# Patient Record
Sex: MALE | Race: WHITE | NOT HISPANIC OR LATINO | Employment: FULL TIME | URBAN - METROPOLITAN AREA
[De-identification: names, ages, dates, MRNs, and addresses within clinical notes are randomized per-mention and may not be internally consistent; named-entity substitution may affect disease eponyms.]

---

## 2020-06-16 ENCOUNTER — OFFICE VISIT (OUTPATIENT)
Dept: URGENT CARE | Facility: MEDICAL CENTER | Age: 30
End: 2020-06-16
Payer: COMMERCIAL

## 2020-06-16 VITALS
TEMPERATURE: 97.6 F | BODY MASS INDEX: 27.92 KG/M2 | HEIGHT: 70 IN | HEART RATE: 113 BPM | OXYGEN SATURATION: 98 % | DIASTOLIC BLOOD PRESSURE: 86 MMHG | WEIGHT: 195 LBS | SYSTOLIC BLOOD PRESSURE: 143 MMHG | RESPIRATION RATE: 16 BRPM

## 2020-06-16 DIAGNOSIS — S60.454A FOREIGN BODY OF RIGHT RING FINGER: Primary | ICD-10-CM

## 2020-06-16 PROCEDURE — 99213 OFFICE O/P EST LOW 20 MIN: CPT | Performed by: FAMILY MEDICINE

## 2020-06-16 RX ORDER — AMOXICILLIN 875 MG/1
875 TABLET, COATED ORAL 2 TIMES DAILY
Qty: 10 TABLET | Refills: 0 | Status: SHIPPED | OUTPATIENT
Start: 2020-06-16 | End: 2020-06-21

## 2020-11-20 ENCOUNTER — NURSE TRIAGE (OUTPATIENT)
Dept: OTHER | Facility: OTHER | Age: 30
End: 2020-11-20

## 2020-11-20 DIAGNOSIS — Z20.822 SUSPECTED COVID-19 VIRUS INFECTION: Primary | ICD-10-CM

## 2020-11-21 DIAGNOSIS — Z20.822 SUSPECTED COVID-19 VIRUS INFECTION: ICD-10-CM

## 2020-11-21 PROCEDURE — U0003 INFECTIOUS AGENT DETECTION BY NUCLEIC ACID (DNA OR RNA); SEVERE ACUTE RESPIRATORY SYNDROME CORONAVIRUS 2 (SARS-COV-2) (CORONAVIRUS DISEASE [COVID-19]), AMPLIFIED PROBE TECHNIQUE, MAKING USE OF HIGH THROUGHPUT TECHNOLOGIES AS DESCRIBED BY CMS-2020-01-R: HCPCS | Performed by: FAMILY MEDICINE

## 2020-11-22 LAB — SARS-COV-2 RNA SPEC QL NAA+PROBE: NOT DETECTED

## 2021-03-08 ENCOUNTER — OFFICE VISIT (OUTPATIENT)
Dept: URGENT CARE | Facility: CLINIC | Age: 31
End: 2021-03-08
Payer: COMMERCIAL

## 2021-03-08 VITALS
DIASTOLIC BLOOD PRESSURE: 86 MMHG | OXYGEN SATURATION: 99 % | SYSTOLIC BLOOD PRESSURE: 140 MMHG | HEART RATE: 108 BPM | RESPIRATION RATE: 20 BRPM | TEMPERATURE: 99.1 F

## 2021-03-08 DIAGNOSIS — R05.9 COUGH: Primary | ICD-10-CM

## 2021-03-08 PROCEDURE — U0003 INFECTIOUS AGENT DETECTION BY NUCLEIC ACID (DNA OR RNA); SEVERE ACUTE RESPIRATORY SYNDROME CORONAVIRUS 2 (SARS-COV-2) (CORONAVIRUS DISEASE [COVID-19]), AMPLIFIED PROBE TECHNIQUE, MAKING USE OF HIGH THROUGHPUT TECHNOLOGIES AS DESCRIBED BY CMS-2020-01-R: HCPCS | Performed by: NURSE PRACTITIONER

## 2021-03-08 PROCEDURE — U0005 INFEC AGEN DETEC AMPLI PROBE: HCPCS | Performed by: NURSE PRACTITIONER

## 2021-03-08 PROCEDURE — S9083 URGENT CARE CENTER GLOBAL: HCPCS | Performed by: NURSE PRACTITIONER

## 2021-03-08 PROCEDURE — G0382 LEV 3 HOSP TYPE B ED VISIT: HCPCS | Performed by: NURSE PRACTITIONER

## 2021-03-08 NOTE — PATIENT INSTRUCTIONS
Patient Instructions     COVID testing initiated  Results may take up to 5-10 days to return, but often come back sooner (2-4 days)     If the patient has a [facility] My Chart account, results may be accessed on line  If the patient does not have the [facility] Chart account, please establish one so results can be accessed  This will be the easiest and quickest way to get a copy of your test results if you require printed documentation  If patient is symptomatic and until results are obtained, home quarantine / self isolation strongly encouraged  If testing is done for screening purposes and patient is not symptomatic, we still recommend masking, social distancing, good hygiene practices be followed  If COVID test is positive, patient / care giver will be contacted by ordering provider or designated staff  If COVID test is positive, please call the primary care provider office to inform of positive test and request follow up evaluation appointment  (Generally, primary care providers are doing telemedicine visits with their positive COVID patients )  If COVID test is positive, please again review all information below  Further questions may be addressed by the primary care provider or the [address] [place] [address] [place] at [phone]  If the patient / caregiver has not heard about test results or has been unable to access results on the patient My Chart account in a timely fashion, please call the provider's office where test was ordered (or Hot Line if applicable)  to inquire about results  If results are negative and patient / care giver has been found to have already accessed results through the [facility]  [facility] Chart [facility]ugene, no call will be made  Until results are obtained, home quarantine / self isolation strongly encouraged       If the patient would develop profound weakness, chest pain, shortness of breath please proceed to an emergency room for further evaluation otherwise we do recommend that patient follow-up with their primary care provider in the next 5-7 days if not improving  Symptomatic treatment as needed for symptoms relief based on age / medical status of patient  Things like warm salt water gargles, Tylenol or Ibuprofen (if not contraindicated), drinking plenty of fluids, nasal saline rinses / spray, warm tea with honey (not for patients less than 1 year of age),  etc may provide symptoms relief  [address]    Your healthcare provider and/or public health staff have evaluated you and have determined that you do not need to remain in the hospital at this time  At this time you can be isolated at home where you will be monitored by staff from your local or state health department  You should carefully follow the prevention and isolation steps below until a healthcare provider or local or state health department says that you can return to your normal activities  Stay home except to get medical care    People who are mildly ill with COVID-19 are able to isolate at home during their illness  You should restrict activities outside your home, except for getting medical care  Do not go to work, school, or public areas  Avoid using public transportation, ride-sharing, or taxis  Separate yourself from other people and animals in your home    People: As much as possible, you should stay in a specific room and away from other people in your home  Also, you should use a separate bathroom, if available  Animals: You should restrict contact with pets and other animals while you are sick with COVID-19, just like you would around other people  Although there have not been reports of pets or other animals becoming sick with COVID-19, it is still recommended that people sick with COVID-19 limit contact with animals until more information is known about the virus  When possible, have another member of your household care for your animals while you are sick   If you are sick with COVID-19, avoid contact with your pet, including petting, snuggling, being kissed or licked, and sharing food  If you must care for your pet or be around animals while you are sick, wash your hands before and after you interact with pets and wear a facemask  See COVID-19 and Animals for more information  Call ahead before visiting your doctor    If you have a medical appointment, call the healthcare provider and tell them that you have or may have COVID-19  This will help the healthcare providers office take steps to keep other people from getting infected or exposed  Wear a facemask    You should wear a facemask when you are around other people (e g , sharing a room or vehicle) or pets and before you enter a healthcare providers office  If you are not able to wear a facemask (for example, because it causes trouble breathing), then people who live with you should not stay in the same room with you, or they should wear a facemask if they enter your room  Cover your coughs and sneezes    Cover your mouth and nose with a tissue when you cough or sneeze  Throw used tissues in a lined trash can  Immediately wash your hands with soap and water for at least 20 seconds or, if soap and water are not available, clean your hands with an alcohol-based hand  that contains at least 60% alcohol  Clean your hands often    Wash your hands often with soap and water for at least 20 seconds, especially after blowing your nose, coughing, or sneezing; going to the bathroom; and before eating or preparing food  If soap and water are not readily available, use an alcohol-based hand  with at least 60% alcohol, covering all surfaces of your hands and rubbing them together until they feel dry  Soap and water are the best option if hands are visibly dirty  Avoid touching your eyes, nose, and mouth with unwashed hands      Avoid sharing personal household items    You should not share dishes, drinking glasses, cups, eating utensils, towels, or bedding with other people or pets in your home  After using these items, they should be washed thoroughly with soap and water  Clean all high-touch surfaces everyday    High touch surfaces include counters, tabletops, doorknobs, bathroom fixtures, toilets, phones, keyboards, tablets, and bedside tables  Also, clean any surfaces that may have blood, stool, or body fluids on them  Use a household cleaning spray or wipe, according to the label instructions  Labels contain instructions for safe and effective use of the cleaning product including precautions you should take when applying the product, such as wearing gloves and making sure you have good ventilation during use of the product  Monitor your symptoms    Seek prompt medical attention if your illness is worsening (e g , difficulty breathing)  Before seeking care, call your healthcare provider and tell them that you have, or are being evaluated for, COVID-19  Put on a facemask before you enter the facility  These steps will help the healthcare providers office to keep other people in the office or waiting room from getting infected or exposed  Ask your healthcare provider to call the local or [facility] health department  Persons who are placed under active monitoring or facilitated self-monitoring should follow instructions provided by their local health department or occupational health professionals, as appropriate  If you have a medical emergency and need to call 911, notify the dispatch personnel that you have, or are being evaluated for COVID-19  If possible, put on a facemask before emergency medical services arrive      Discontinuing home isolation    Patients with confirmed COVID-19 should remain under home isolation precautions until the following conditions are met:   - They have had no fever for at least 24 hours (that is one full day of no fever without the use medicine that reduces fevers)  AND  - other symptoms have improved (for example, when their cough or shortness of breath have improved)  AND  - If had mild or moderate illness, at least 10 days have passed since their symptoms first appeared or if severe illness (needed oxygen) or immunosuppressed, at least 20 days have passed since symptoms first appeared  Patients with confirmed COVID-19 should also notify close contacts (including their workplace) and ask that they self-quarantine  Currently, close contact is defined as being within 6 feet for 15 minutes or more from the period 24 hours starting 48 hours before symptom onset to the time at which the patient went into isolation  Close contacts of patients diagnosed with COVID-19 should be instructed by the patient to self-quarantine for 14 days from the last time of their last contact with the patient       Source: [employer]

## 2021-03-08 NOTE — PROGRESS NOTES
[facility]        NAME: Jena Camara is a 32 y o  male  : 1990    MRN: [id number]  DATE: 2021  TIME: 5:18 PM    Assessment and Plan   Cough [R05]  1  Cough  Novel Coronavirus (Covid-19),PCR [facility] - Office Collection     covid swab done -   No work until results back   rec trial reflux meds or antihistamine for cough if returns after eating when URI symptoms are resolved  Pt in agreement with plan of care    Patient Instructions     Follow up with PCP in 3-5 days  Proceed to  ER if symptoms worsen  Chief Complaint     Chief Complaint   Patient presents with    Cough     Cough for month that comes after he eats  Patient states that he still has cough, but now has a  HA, sore throat and nose stuffy nose  History of Present Illness   Jena Camara presents to the clinic c/o    Cough for month that comes after he eats  Patient states that he still has cough, but now has a HA, sore throat and nose stuffy nose  Brother was sick recently - he was around him  Brother did not get covid testing  States brother had covid a few months ago  Marie Nicholas had first covid shot about a month - is scheduled for second shot tomorrow  Works in IT for [employer]  Did not try anything for the cough originally - no reflux meds or antihistamines  Review of Systems   Review of Systems   All other systems reviewed and are negative  Current Medications     No long-term medications on file  Current Allergies     Allergies as of 2021    (No Known Allergies)            The following portions of the patient's history were reviewed and updated as appropriate: allergies, current medications, past family history, past medical history, past social history, past surgical history and problem list     Objective   /86   Pulse (!) 108   Temp 99 1 °F (37 3 °C) (Tympanic)   Resp 20   SpO2 99%        Physical Exam     Physical Exam  Vitals signs and nursing note reviewed  Constitutional:       Appearance: Normal appearance  He is well-developed  HENT:      Head: Normocephalic and atraumatic  Right Ear: Tympanic membrane, ear canal and external ear normal       Left Ear: Tympanic membrane, ear canal and external ear normal       Nose: Nose normal    Eyes:      General: Lids are normal       Conjunctiva/sclera: Conjunctivae normal       Pupils: Pupils are equal, round, and reactive to light  Neck:      Musculoskeletal: Neck supple  Cardiovascular:      Rate and Rhythm: Normal rate and regular rhythm  Pulses: Normal pulses  Heart sounds: Normal heart sounds, S1 normal and S2 normal    Pulmonary:      Effort: Pulmonary effort is normal       Breath sounds: Normal breath sounds  Skin:     General: Skin is warm and dry  Neurological:      Mental Status: He is alert  Psychiatric:         Speech: Speech normal          Behavior: Behavior normal          Thought Content:  Thought content normal          Judgment: Judgment normal

## 2021-03-09 LAB — SARS-COV-2 RNA RESP QL NAA+PROBE: NEGATIVE

## 2021-06-29 ENCOUNTER — OFFICE VISIT (OUTPATIENT)
Dept: FAMILY MEDICINE CLINIC | Facility: CLINIC | Age: 31
End: 2021-06-29
Payer: COMMERCIAL

## 2021-06-29 ENCOUNTER — APPOINTMENT (OUTPATIENT)
Dept: LAB | Facility: MEDICAL CENTER | Age: 31
End: 2021-06-29
Payer: COMMERCIAL

## 2021-06-29 VITALS
DIASTOLIC BLOOD PRESSURE: 80 MMHG | OXYGEN SATURATION: 98 % | SYSTOLIC BLOOD PRESSURE: 118 MMHG | BODY MASS INDEX: 27.86 KG/M2 | HEIGHT: 71 IN | HEART RATE: 90 BPM | WEIGHT: 199 LBS | TEMPERATURE: 98.4 F

## 2021-06-29 DIAGNOSIS — Z13.29 SCREENING FOR THYROID DISORDER: ICD-10-CM

## 2021-06-29 DIAGNOSIS — Z76.89 ENCOUNTER TO ESTABLISH CARE: ICD-10-CM

## 2021-06-29 DIAGNOSIS — R51.9 INTRACTABLE HEADACHE, UNSPECIFIED CHRONICITY PATTERN, UNSPECIFIED HEADACHE TYPE: Primary | ICD-10-CM

## 2021-06-29 DIAGNOSIS — R53.83 FATIGUE, UNSPECIFIED TYPE: ICD-10-CM

## 2021-06-29 DIAGNOSIS — R06.83 SNORING: ICD-10-CM

## 2021-06-29 DIAGNOSIS — Z13.220 SCREENING FOR LIPID DISORDERS: ICD-10-CM

## 2021-06-29 DIAGNOSIS — Z13.1 SCREENING FOR DIABETES MELLITUS: ICD-10-CM

## 2021-06-29 DIAGNOSIS — Z13.0 SCREENING FOR DEFICIENCY ANEMIA: ICD-10-CM

## 2021-06-29 DIAGNOSIS — L30.0 NUMMULAR ECZEMA: ICD-10-CM

## 2021-06-29 LAB
ALBUMIN SERPL BCP-MCNC: 4 G/DL (ref 3.5–5)
ALP SERPL-CCNC: 74 U/L (ref 46–116)
ALT SERPL W P-5'-P-CCNC: 54 U/L (ref 12–78)
ANION GAP SERPL CALCULATED.3IONS-SCNC: 5 MMOL/L (ref 4–13)
AST SERPL W P-5'-P-CCNC: 26 U/L (ref 5–45)
BASOPHILS # BLD AUTO: 0.07 THOUSANDS/ΜL (ref 0–0.1)
BASOPHILS NFR BLD AUTO: 1 % (ref 0–1)
BILIRUB SERPL-MCNC: 0.38 MG/DL (ref 0.2–1)
BUN SERPL-MCNC: 11 MG/DL (ref 5–25)
CALCIUM SERPL-MCNC: 9.1 MG/DL (ref 8.3–10.1)
CHLORIDE SERPL-SCNC: 105 MMOL/L (ref 100–108)
CHOLEST SERPL-MCNC: 246 MG/DL (ref 50–200)
CO2 SERPL-SCNC: 27 MMOL/L (ref 21–32)
CREAT SERPL-MCNC: 1.25 MG/DL (ref 0.6–1.3)
EOSINOPHIL # BLD AUTO: 0.33 THOUSAND/ΜL (ref 0–0.61)
EOSINOPHIL NFR BLD AUTO: 3 % (ref 0–6)
ERYTHROCYTE [DISTWIDTH] IN BLOOD BY AUTOMATED COUNT: 13.3 % (ref 11.6–15.1)
GFR SERPL CREATININE-BSD FRML MDRD: 76 ML/MIN/1.73SQ M
GLUCOSE P FAST SERPL-MCNC: 105 MG/DL (ref 65–99)
HCT VFR BLD AUTO: 45.2 % (ref 36.5–49.3)
HDLC SERPL-MCNC: 37 MG/DL
HGB BLD-MCNC: 14.9 G/DL (ref 12–17)
IMM GRANULOCYTES # BLD AUTO: 0.04 THOUSAND/UL (ref 0–0.2)
IMM GRANULOCYTES NFR BLD AUTO: 0 % (ref 0–2)
LDLC SERPL CALC-MCNC: 165 MG/DL (ref 0–100)
LYMPHOCYTES # BLD AUTO: 2.68 THOUSANDS/ΜL (ref 0.6–4.47)
LYMPHOCYTES NFR BLD AUTO: 27 % (ref 14–44)
MCH RBC QN AUTO: 29.9 PG (ref 26.8–34.3)
MCHC RBC AUTO-ENTMCNC: 33 G/DL (ref 31.4–37.4)
MCV RBC AUTO: 91 FL (ref 82–98)
MONOCYTES # BLD AUTO: 0.75 THOUSAND/ΜL (ref 0.17–1.22)
MONOCYTES NFR BLD AUTO: 7 % (ref 4–12)
NEUTROPHILS # BLD AUTO: 6.21 THOUSANDS/ΜL (ref 1.85–7.62)
NEUTS SEG NFR BLD AUTO: 62 % (ref 43–75)
NONHDLC SERPL-MCNC: 209 MG/DL
NRBC BLD AUTO-RTO: 0 /100 WBCS
PLATELET # BLD AUTO: 309 THOUSANDS/UL (ref 149–390)
PMV BLD AUTO: 9.9 FL (ref 8.9–12.7)
POTASSIUM SERPL-SCNC: 4.1 MMOL/L (ref 3.5–5.3)
PROT SERPL-MCNC: 7.4 G/DL (ref 6.4–8.2)
RBC # BLD AUTO: 4.98 MILLION/UL (ref 3.88–5.62)
SODIUM SERPL-SCNC: 137 MMOL/L (ref 136–145)
TRIGL SERPL-MCNC: 219 MG/DL
TSH SERPL DL<=0.05 MIU/L-ACNC: 1.03 UIU/ML (ref 0.36–3.74)
WBC # BLD AUTO: 10.08 THOUSAND/UL (ref 4.31–10.16)

## 2021-06-29 PROCEDURE — 84443 ASSAY THYROID STIM HORMONE: CPT

## 2021-06-29 PROCEDURE — 80053 COMPREHEN METABOLIC PANEL: CPT

## 2021-06-29 PROCEDURE — 80061 LIPID PANEL: CPT

## 2021-06-29 PROCEDURE — 3725F SCREEN DEPRESSION PERFORMED: CPT | Performed by: NURSE PRACTITIONER

## 2021-06-29 PROCEDURE — 85025 COMPLETE CBC W/AUTO DIFF WBC: CPT

## 2021-06-29 PROCEDURE — 36415 COLL VENOUS BLD VENIPUNCTURE: CPT

## 2021-06-29 PROCEDURE — 99203 OFFICE O/P NEW LOW 30 MIN: CPT | Performed by: NURSE PRACTITIONER

## 2021-06-29 RX ORDER — METHOCARBAMOL 500 MG/1
500 TABLET, FILM COATED ORAL 4 TIMES DAILY
Qty: 30 TABLET | Refills: 0 | Status: SHIPPED | OUTPATIENT
Start: 2021-06-29

## 2021-07-01 NOTE — PROGRESS NOTES
[facility]    ASSESSMENT AND PLAN     1  Intractable headache, unspecified chronicity pattern, unspecified headache type   reviewed symptoms length today  Physical assessment unremarkable  Based on description and location, consider cause of muscular tension  Discussed proper body mechanics, since patient sits at a computer for extended hours at a time  Recommend optometric exam, to assess visual acuity  May continue ibuprofen PRN as it does appear effective  Rx today for Robaxin  May use at night for muscle tension/spasming  Discuss use of ice/heat  Encourage smoking cessation as below  Decrease caffeine/sugar intake  Fasting lab work orders placed as below  Obtain within the next 1-2 weeks, and return for physical and symptom reassessment at that time  Keep activity/ diet diary to assess for possible triggers and or relieving  Patient agreeable with above plan  ER precautions with unrelenting and or severe headache    - methocarbamol (ROBAXIN) 500 mg tablet; Take 1 tablet (500 mg total) by mouth 4 (four) times a day  Dispense: 30 tablet; Refill: 0    2  Screening for deficiency anemia    - CBC and differential; Future    3  Screening for diabetes mellitus    - Comprehensive metabolic panel; Future    4  Screening for thyroid disorder    - TSH, 3rd generation with Free T4 reflex; Future    5  Screening for lipid disorders    - Lipid panel; Future    6  Nummular eczema   patient states he was diagnosed with nummular eczema in 2018  He does have a steroid cream that he uses periodically  Requests referral to Dermatology for assessment and possible alternate treatment options    - Ambulatory referral to Dermatology; Future      7  Fatigue, unspecified type  Reviewed symptoms  Appears his fatigue is multifactorial  Decreased restful sleep - pt with snoring and possible apnea  Will refer to Sleep medicine for evaluation  Additionally discussed poor diet   Consumes excessive sugar and caffiene during the day  Discussed negative effect on sleep  Will work to decrease  May continue Melatonin 5  Discuss further at next follow up    8  Snoring    -  Ambulatory referral to Sleep Medicine      9  Encounter to establish care   establishing primary care in our office  Transitioning from [facility]  SUBJECTIVE       Patient ID: Juan Garland is a 32 y o  male  Chief Complaint   Patient presents with    Headache     x2 weeks     Fatigue     x several years        HISTORY OF PRESENT ILLNESS     Patient presents today as a new patient  Transitioning from [facility]  Complains today of persistent headache  Roughly the last 2-2 1/2 weeks  Primarily in the back of his head  Some upper neck tenderness  Does not radiate  Cannot identify any aggravating triggers  Can not rate the pain, just states it is dull,  "annoying" and fairly constant  OT C ibuprofen will relieve it, but it does return once a ibuprofen wears off  Denies any eye pain  No blurriness or double vision  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  complains today also of persistent fatigue  This has been present for years  Excessive fatigue at times during the day        The following portions of the patient's history were reviewed and updated as appropriate: allergies, current medications, past family history, past medical history, past social history, past surgical history and problem list     REVIEW OF SYSTEMS  Review of Systems   Constitutional: Positive for fatigue (ongoing for years)  HENT: Negative  Respiratory: Negative  Cardiovascular: Negative  Gastrointestinal: Negative  Neurological: Positive for headaches (as in HPI)  Negative for dizziness  Psychiatric/Behavioral: Negative          OBJECTIVE      VITAL SIGNS  /80 (BP Location: Right arm, Patient Position: Sitting)   Pulse 90   Temp 98 4 °F (36 9 °C) (Tympanic)   Ht 5' 10 75" (1 797 m)   Wt 90 3 kg (199 lb)   SpO2 98%   BMI 27 95 kg/m² CURRENT MEDICATIONS    Current Outpatient Medications:     methocarbamol (ROBAXIN) 500 mg tablet, Take 1 tablet (500 mg total) by mouth 4 (four) times a day, Disp: 30 tablet, Rfl: 0      PHYSICAL EXAMINATION   Physical Exam  Vitals and nursing note reviewed  Constitutional:       General: He is not in acute distress  Appearance: Normal appearance  He is well-developed  He is not ill-appearing  HENT:      Head: Normocephalic and atraumatic  Right Ear: Hearing, tympanic membrane, ear canal and external ear normal       Left Ear: Hearing, tympanic membrane, ear canal and external ear normal       Nose: Nose normal       Mouth/Throat:      Mouth: Mucous membranes are moist       Pharynx: Oropharynx is clear  Eyes:      Extraocular Movements: Extraocular movements intact  Conjunctiva/sclera: Conjunctivae normal       Pupils: Pupils are equal, round, and reactive to light  Neck:      Vascular: No carotid bruit  Cardiovascular:      Rate and Rhythm: Normal rate and regular rhythm  No extrasystoles are present  Heart sounds: Normal heart sounds  Pulmonary:      Effort: Pulmonary effort is normal  No respiratory distress  Breath sounds: Normal breath sounds and air entry  Musculoskeletal:      Right lower leg: No edema  Left lower leg: No edema  Lymphadenopathy:      Cervical: No cervical adenopathy  Skin:     General: Skin is warm and dry  Comments: Small, red bumps scattered on legs b/l  Neurological:      General: No focal deficit present  Mental Status: He is alert and oriented to person, place, and time  Cranial Nerves: No cranial nerve deficit  Sensory: No sensory deficit  Motor: No weakness        Coordination: Coordination normal       Gait: Gait normal    Psychiatric:         Attention and Perception: Attention normal          Mood and Affect: Mood and affect normal          Speech: Speech normal          Behavior: Behavior normal  Thought Content:  Thought content normal

## 2021-07-15 ENCOUNTER — OFFICE VISIT (OUTPATIENT)
Dept: FAMILY MEDICINE CLINIC | Facility: CLINIC | Age: 31
End: 2021-07-15
Payer: COMMERCIAL

## 2021-07-15 VITALS
RESPIRATION RATE: 17 BRPM | BODY MASS INDEX: 27.66 KG/M2 | OXYGEN SATURATION: 99 % | DIASTOLIC BLOOD PRESSURE: 72 MMHG | HEIGHT: 71 IN | WEIGHT: 197.6 LBS | TEMPERATURE: 97.8 F | SYSTOLIC BLOOD PRESSURE: 108 MMHG | HEART RATE: 98 BPM

## 2021-07-15 DIAGNOSIS — E78.5 HYPERLIPIDEMIA, UNSPECIFIED HYPERLIPIDEMIA TYPE: Primary | ICD-10-CM

## 2021-07-15 DIAGNOSIS — R51.9 INTRACTABLE HEADACHE, UNSPECIFIED CHRONICITY PATTERN, UNSPECIFIED HEADACHE TYPE: ICD-10-CM

## 2021-07-15 DIAGNOSIS — A69.20 ERYTHEMA MIGRANS (LYME DISEASE): ICD-10-CM

## 2021-07-15 DIAGNOSIS — Z72.0 TOBACCO USE: ICD-10-CM

## 2021-07-15 DIAGNOSIS — R73.01 ELEVATED FASTING GLUCOSE: ICD-10-CM

## 2021-07-15 LAB — SL AMB POCT HEMOGLOBIN AIC: 5.5 (ref ?–6.5)

## 2021-07-15 PROCEDURE — 83036 HEMOGLOBIN GLYCOSYLATED A1C: CPT | Performed by: NURSE PRACTITIONER

## 2021-07-15 PROCEDURE — 99214 OFFICE O/P EST MOD 30 MIN: CPT | Performed by: NURSE PRACTITIONER

## 2021-07-15 PROCEDURE — 3008F BODY MASS INDEX DOCD: CPT | Performed by: NURSE PRACTITIONER

## 2021-07-15 PROCEDURE — 4004F PT TOBACCO SCREEN RCVD TLK: CPT | Performed by: NURSE PRACTITIONER

## 2021-07-15 RX ORDER — DOXYCYCLINE HYCLATE 100 MG/1
100 CAPSULE ORAL EVERY 12 HOURS SCHEDULED
Qty: 28 CAPSULE | Refills: 0 | Status: SHIPPED | OUTPATIENT
Start: 2021-07-15 | End: 2021-07-29

## 2021-07-15 RX ORDER — NICOTINE 21 MG/24HR
1 PATCH, TRANSDERMAL 24 HOURS TRANSDERMAL EVERY 24 HOURS
Qty: 28 PATCH | Refills: 0 | Status: SHIPPED | OUTPATIENT
Start: 2021-07-15

## 2021-07-15 RX ORDER — NAPROXEN 500 MG/1
500 TABLET ORAL 2 TIMES DAILY WITH MEALS
Qty: 60 TABLET | Refills: 0 | Status: SHIPPED | OUTPATIENT
Start: 2021-07-15

## 2021-07-18 NOTE — PROGRESS NOTES
[facility]    ASSESSMENT AND PLAN     1  Hyperlipidemia, unspecified hyperlipidemia type   reviewed recent lab work  Cholesterol elevated at 264  LDL: 165  HDL: 37  Trigs: 219  Discussed importance of gaining control  Will continue with diet and lifestyle changes  Discussed at length again today  Recheck lipid panel 6 months  Orders placed today-fasting    - Lipid panel; Future    2  Elevated fasting glucose   elevated fastin  POCT hemoglobin A1c today:  5 5    - POCT hemoglobin A1c    3  Tobacco use   strongly encouraged smoking cessation  Will likely improve headaches  Will trial Nicoderm patches  Rx given today    - nicotine (NICODERM CQ) 21 mg/24 hr TD 24 hr patch; Place 1 patch on the skin every 24 hours  Dispense: 28 patch; Refill: 0    4  Erythema migrans (Lyme disease)    Unsure if tick bite on right lower shin  Will treat prophylactically today with 2 week course doxy  Monitor area for signs of infection  Follow-up with any problems and/or concerns    - doxycycline hyclate (VIBRAMYCIN) 100 mg capsule; Take 1 capsule (100 mg total) by mouth every 12 (twelve) hours for 14 days  Dispense: 28 capsule; Refill: 0    5  Intractable headache, unspecified chronicity pattern, unspecified headache type   appear to be slowly improving  May continue muscle relaxer as needed  Rx today for naproxen  Discussed again following up with an optometrist, to assess visual acuity  Recommend diet/activity diary to pinpoint any specific triggers  Continue decreasing alcohol and caffeine intake  Tobacco cessation,  As all these are likely exacerbating factors  Follow-up if persistent  Consider headache specialist referral    - naproxen (NAPROSYN) 500 mg tablet; Take 1 tablet (500 mg total) by mouth 2 (two) times a day with meals  Dispense: 60 tablet; Refill: 0      BMI Counseling: Body mass index is 27 66 kg/m²   The BMI is above normal  Nutrition recommendations include decreasing portion sizes, encouraging healthy choices of fruits and vegetables, decreasing fast food intake, consuming healthier snacks, limiting drinks that contain sugar, moderation in carbohydrate intake, increasing intake of lean protein and reducing intake of saturated and trans fat  Exercise recommendations include moderate physical activity 150 minutes/week  Discussed importance of diet and exercise to decrease his lipid panel  Also discussed benefit of increasing energy level/decrease fatigue  Depression Screening and Follow-up Plan: Clincally patient does not have depression  No treatment is required  Tobacco Cessation Counseling: Tobacco cessation counseling was provided  The patient is sincerely urged to quit consumption of tobacco  He is ready to quit tobacco  Medication options and side effects of medication discussed  Nicotine patch was prescribed  Follow-up with optometry and Sleep Medicine    SUBJECTIVE       Patient ID: Charity Cooley is a 32 y o  male  Chief Complaint   Patient presents with    Follow-up     x3wk Headache & Fatigue       HISTORY OF PRESENT ILLNESS     Patient presents today for follow-up: Headache and fatigue  Does note some improvement since starting the   Muscle relaxer for his headache  Notes of decrease in neck pain  But no change in his fatigue  Has been making lifestyle change  Decreasing alcohol and soda intake  Is now down to 1-2 sodas per day  Has not followed up with optometrist as previously recommended  No dietary/ activity diary to review  Appointment scheduled for sleep medicine        The following portions of the patient's history were reviewed and updated as appropriate: allergies, current medications, past family history, past medical history, past social history, past surgical history and problem list     REVIEW OF SYSTEMS  Review of Systems   Constitutional: Positive for fatigue  Respiratory: Negative  Cardiovascular: Negative      Neurological: Positive for headaches (still present but improving)  Psychiatric/Behavioral: Negative  OBJECTIVE      VITAL SIGNS  /72 (BP Location: Left arm, Patient Position: Sitting, Cuff Size: Standard)   Pulse 98   Temp 97 8 °F (36 6 °C) (Tympanic)   Resp 17   Ht 5' 10 87" (1 8 m)   Wt 89 6 kg (197 lb 9 6 oz)   SpO2 99%   BMI 27 66 kg/m²     CURRENT MEDICATIONS    Current Outpatient Medications:     methocarbamol (ROBAXIN) 500 mg tablet, Take 1 tablet (500 mg total) by mouth 4 (four) times a day, Disp: 30 tablet, Rfl: 0    doxycycline hyclate (VIBRAMYCIN) 100 mg capsule, Take 1 capsule (100 mg total) by mouth every 12 (twelve) hours for 14 days, Disp: 28 capsule, Rfl: 0    naproxen (NAPROSYN) 500 mg tablet, Take 1 tablet (500 mg total) by mouth 2 (two) times a day with meals, Disp: 60 tablet, Rfl: 0    nicotine (NICODERM CQ) 21 mg/24 hr TD 24 hr patch, Place 1 patch on the skin every 24 hours, Disp: 28 patch, Rfl: 0      PHYSICAL EXAMINATION   Physical Exam  Vitals and nursing note reviewed  Constitutional:       General: He is not in acute distress  Appearance: Normal appearance  He is not ill-appearing  Cardiovascular:      Rate and Rhythm: Normal rate and regular rhythm  Heart sounds: Normal heart sounds  Pulmonary:      Effort: Pulmonary effort is normal  No respiratory distress  Breath sounds: Normal breath sounds and air entry  Skin:     General: Skin is warm and dry  Neurological:      General: No focal deficit present  Mental Status: He is alert and oriented to person, place, and time  Psychiatric:         Attention and Perception: Attention normal          Mood and Affect: Mood and affect normal          Speech: Speech normal          Behavior: Behavior normal          Thought Content:  Thought content normal

## 2021-07-29 ENCOUNTER — OFFICE VISIT (OUTPATIENT)
Dept: URGENT CARE | Facility: CLINIC | Age: 31
End: 2021-07-29
Payer: COMMERCIAL

## 2021-07-29 VITALS
TEMPERATURE: 96.4 F | RESPIRATION RATE: 16 BRPM | HEART RATE: 78 BPM | WEIGHT: 197 LBS | BODY MASS INDEX: 27.58 KG/M2 | OXYGEN SATURATION: 98 % | HEIGHT: 71 IN

## 2021-07-29 DIAGNOSIS — J06.9 ACUTE URI: Primary | ICD-10-CM

## 2021-07-29 DIAGNOSIS — J02.9 SORE THROAT: ICD-10-CM

## 2021-07-29 LAB — S PYO AG THROAT QL: NEGATIVE

## 2021-07-29 PROCEDURE — 87880 STREP A ASSAY W/OPTIC: CPT | Performed by: NURSE PRACTITIONER

## 2021-07-29 PROCEDURE — 99213 OFFICE O/P EST LOW 20 MIN: CPT | Performed by: NURSE PRACTITIONER

## 2021-07-29 NOTE — PATIENT INSTRUCTIONS
Cold Symptoms   AMBULATORY CARE:   Cold symptoms  include sneezing, dry throat, a stuffy nose, headache, watery eyes, and a cough  Your cough may be dry, or you may cough up mucus  You may also have muscle aches, joint pain, and tiredness  Rarely, you may have a fever  Cold symptoms occur from inflammation in your upper respiratory system caused by a virus  Most colds go away without treatment  Seek care immediately if:   · You have increased tiredness and weakness  · You are unable to eat  · Your heart is beating much faster than usual for you  · You see white spots in the back of your throat and your neck is swollen and sore to the touch  · You see pinpoint or larger reddish-purple dots on your skin  Contact your healthcare provider if:   · You have a fever higher than 102°F (38 9°C)  · You have new or worsening shortness of breath  · You have thick nasal drainage for more than 2 days  · Your symptoms do not improve or get worse within 5 days  · You have questions or concerns about your condition or care  Treatment for cold symptoms  may include NSAIDS to decrease muscle aches and fever  Cold medicines may also be given to decrease coughing, nasal stuffiness, sneezing, and a runny nose  Manage your cold symptoms: The following may help relieve cold symptoms, such as a dry throat and congestion:  · Gargle with mouthwash or warm salt water as directed  · Suck on throat lozenges or hard candy  · Use a cold or warm vaporizer or humidifier to ease your breathing  · Rest for at least 2 days and then as needed to decrease tiredness and weakness  · Use petroleum based jelly around your nostrils to decrease irritation from blowing your nose  · Drink plenty of liquids  Liquids will help thin and loosen thick mucus so you can cough it up  Liquids will also keep you hydrated   Ask your healthcare provider which liquids are best for you and how much to drink each day     Prevent the spread of germs  by washing your hands often  You can spread your cold germs to others for at least 3 days after your symptoms start  Do not share items, such as eating utensils  Cover your nose and mouth when you cough or sneeze using the crook of your elbow instead of your hands  Throw used tissues in the garbage  Do not smoke:  Smoking may worsen your symptoms and increase the length of time you feel sick  Talk with your healthcare provider if you need help to stop smoking  Follow up with your healthcare provider as directed:  Write down your questions so you remember to ask them during your visits  © Copyright [employer] 2021 Information is for End User's use only and may not be sold, redistributed or otherwise used for commercial purposes  All illustrations and images included in CareNotes® are the copyrighted property of A D A M [employer] Inc  or Kimberly Mendoza  The above information is an  only  It is not intended as medical advice for individual conditions or treatments  Talk to your doctor, nurse or pharmacist before following any medical regimen to see if it is safe and effective for you

## 2021-07-29 NOTE — PROGRESS NOTES
[facility] [employer] [facility]        NAME: Keri Tran is a 32 y o  male  : 1990    MRN: [id number]  DATE: 2021  TIME: 4:50 PM    Assessment and Plan   Acute URI [J06 9]  1  Acute URI       Neg rapid strep in office   Declines covid vaccine - is fully vaccinated   Discussed otc tx for uri   F/u with pcp in 3-5 days or with worsening symptoms   Pt in agreement with plan of care      Patient Instructions     Follow up with PCP in 3-5 days  Proceed to  ER if symptoms worsen  Chief Complaint     Chief Complaint   Patient presents with    Cold Like Symptoms     Pt c/o cough, congestion, sore throat, b/l ear pain, and stiff neck  Pt started with stiff neck Monday and other symptoms yesterday  Pt also c/o headache for a month  Pt is fully vaccinated with Moderna vaccine  Denies fever  Pt has been taking Advil for symptoms  History of Present Illness   Keri Tran presents to the clinic c/o    Pt c/o cough, congestion, sore throat, L ear discomfort, and stiff neck  Pt started with stiff neck Monday and other symptoms yesterday  Pt is fully vaccinated with Moderna vaccine  Denies fever  Pt has been taking Advil for symptoms  Was around son who was sick over the weekend  Pt also c/o headache for the past 6 weeks - has been seen and managed by pcp upstairs  Review of Systems   Review of Systems   All other systems reviewed and are negative          Current Medications     Long-Term Medications   Medication Sig Dispense Refill    methocarbamol (ROBAXIN) 500 mg tablet Take 1 tablet (500 mg total) by mouth 4 (four) times a day 30 tablet 0    naproxen (NAPROSYN) 500 mg tablet Take 1 tablet (500 mg total) by mouth 2 (two) times a day with meals 60 tablet 0    nicotine (NICODERM CQ) 21 mg/24 hr TD 24 hr patch Place 1 patch on the skin every 24 hours (Patient not taking: Reported on 2021) 28 patch 0       Current Allergies     Allergies as of 2021    (No Known Allergies)            The following portions of the patient's history were reviewed and updated as appropriate: allergies, current medications, past family history, past medical history, past social history, past surgical history and problem list     Objective   Pulse 78   Temp (!) 96 4 °F (35 8 °C) (Tympanic)   Resp 16   Ht 5' 10 87" (1 8 m)   Wt 89 4 kg (197 lb)   SpO2 98%   BMI 27 58 kg/m²        Physical Exam     Physical Exam  Vitals and nursing note reviewed  Constitutional:       Appearance: Normal appearance  He is well-developed  HENT:      Head: Normocephalic and atraumatic  Right Ear: Hearing, tympanic membrane, ear canal and external ear normal       Left Ear: Hearing, tympanic membrane, ear canal and external ear normal       Nose: Mucosal edema and congestion present  Mouth/Throat:      Pharynx: Posterior oropharyngeal erythema (mild) present  Eyes:      General: Lids are normal       Conjunctiva/sclera: Conjunctivae normal       Pupils: Pupils are equal, round, and reactive to light  Cardiovascular:      Rate and Rhythm: Normal rate and regular rhythm  Heart sounds: Normal heart sounds, S1 normal and S2 normal    Pulmonary:      Effort: Pulmonary effort is normal       Breath sounds: Normal breath sounds  Skin:     General: Skin is warm and dry  Neurological:      Mental Status: He is alert  Psychiatric:         Speech: Speech normal          Behavior: Behavior normal          Thought Content:  Thought content normal          Judgment: Judgment normal

## 2021-08-01 ENCOUNTER — NURSE TRIAGE (OUTPATIENT)
Dept: OTHER | Facility: OTHER | Age: 31
End: 2021-08-01

## 2021-08-01 DIAGNOSIS — Z20.828 SARS-ASSOCIATED CORONAVIRUS EXPOSURE: Primary | ICD-10-CM

## 2021-08-01 PROCEDURE — 87635 SARS-COV-2 COVID-19 AMP PRB: CPT | Performed by: NURSE PRACTITIONER

## 2021-08-01 NOTE — TELEPHONE ENCOUNTER
Reason for Disposition   [1] COVID-19 infection suspected by caller or triager AND [2] mild symptoms (cough, fever, or others) AND [6] no complications or SOB    Answer Assessment - Initial Assessment Questions  1  Were you within 6 feet or less, for up to 15 minutes or more with a person that has a confirmed COVID-19 test?   No but son was with his mother who is positive  Also work at [employer]  2  What was the date of your exposure? Son lives with me     3  Are you experiencing any symptoms attributed to the virus?  (Assess for SOB, cough, fever, difficulty breathing)   Cough, sore throat, and stiff neck     4   HIGH RISK: Do you have any history heart or lung conditions, weakened immune system, diabetes, Asthma, CHF, HIV, COPD, Chemo, renal failure, sickle cell, etc?   No    Protocols used: CORONAVIRUS (COVID-19) DIAGNOSED OR SUSPECTED-ADULT-

## 2021-08-01 NOTE — TELEPHONE ENCOUNTER
Regarding: COVID - Symptomatic (Cough)  ----- Message from Mayte Jordan sent at 8/1/2021  9:07 AM EDT -----  "I have been sick for close to a week now  I've had a stiff neck, a cough, and a sore throat   I got checked, and was told it was a viral infection, but my son's mother tested positive yesterday "

## 2021-08-02 ENCOUNTER — TELEPHONE (OUTPATIENT)
Dept: FAMILY MEDICINE CLINIC | Facility: CLINIC | Age: 31
End: 2021-08-02

## 2021-08-02 NOTE — TELEPHONE ENCOUNTER
----- Message from [address] [place] [address] sent at 8/2/2021 10:04 AM EDT -----   Please call patient    COVID negative

## 2021-08-06 ENCOUNTER — OFFICE VISIT (OUTPATIENT)
Dept: URGENT CARE | Facility: CLINIC | Age: 31
End: 2021-08-06
Payer: COMMERCIAL

## 2021-08-06 VITALS
RESPIRATION RATE: 18 BRPM | OXYGEN SATURATION: 98 % | HEART RATE: 120 BPM | WEIGHT: 197 LBS | BODY MASS INDEX: 28.2 KG/M2 | HEIGHT: 70 IN | TEMPERATURE: 100.4 F

## 2021-08-06 DIAGNOSIS — B34.9 ACUTE VIRAL SYNDROME: Primary | ICD-10-CM

## 2021-08-06 PROCEDURE — U0003 INFECTIOUS AGENT DETECTION BY NUCLEIC ACID (DNA OR RNA); SEVERE ACUTE RESPIRATORY SYNDROME CORONAVIRUS 2 (SARS-COV-2) (CORONAVIRUS DISEASE [COVID-19]), AMPLIFIED PROBE TECHNIQUE, MAKING USE OF HIGH THROUGHPUT TECHNOLOGIES AS DESCRIBED BY CMS-2020-01-R: HCPCS | Performed by: PHYSICIAN ASSISTANT

## 2021-08-06 PROCEDURE — 99213 OFFICE O/P EST LOW 20 MIN: CPT | Performed by: PHYSICIAN ASSISTANT

## 2021-08-06 PROCEDURE — U0005 INFEC AGEN DETEC AMPLI PROBE: HCPCS | Performed by: PHYSICIAN ASSISTANT

## 2021-08-06 RX ORDER — ALBUTEROL SULFATE 90 UG/1
2 AEROSOL, METERED RESPIRATORY (INHALATION) EVERY 6 HOURS PRN
Qty: 8.5 G | Refills: 0 | Status: SHIPPED | OUTPATIENT
Start: 2021-08-06

## 2021-08-06 RX ORDER — IBUPROFEN 800 MG/1
800 TABLET ORAL EVERY 8 HOURS SCHEDULED
Qty: 30 TABLET | Refills: 0 | Status: SHIPPED | OUTPATIENT
Start: 2021-08-06

## 2021-08-06 RX ORDER — DOXYCYCLINE HYCLATE 100 MG/1
100 TABLET, DELAYED RELEASE ORAL 2 TIMES DAILY
Qty: 14 TABLET | Refills: 0 | Status: SHIPPED | OUTPATIENT
Start: 2021-08-06 | End: 2021-08-13

## 2021-08-06 NOTE — LETTER
August 6, 2021     Patient: Joe Robledo   YOB: 1990   Date of Visit: 8/6/2021       To Whom It May Concern: It is my medical opinion that Steven Hdez should not return to work until receipt of an additional negative Covid-19 test result  If you have any questions or concerns, please don't hesitate to call  Sincerely,        Steffanie Ortiz PA-C    CC:  Joe Robledo

## 2021-08-06 NOTE — PROGRESS NOTES
[facility]        NAME: Ni  is a 32 y o  male  : 1990    MRN: [id number]  DATE: 2021  TIME: 12:23 PM    Assessment and Plan   Acute viral syndrome [B34 9]  1  Acute viral syndrome  Novel Coronavirus (Covid-19),PCR [facility] - Office Collection    doxycycline (DORYX) 100 MG EC tablet    ibuprofen (MOTRIN) 800 mg tablet    albuterol (ProAir HFA) 90 mcg/act inhaler         Patient Instructions       Follow up with PCP in 3-5 days  Proceed to  ER if symptoms worsen  Chief Complaint     Chief Complaint   Patient presents with    Flu Symptoms     Pt states worsening flu like symptoms x 10 days  Was seen  dx viral uri and  negative covid  Continues to have fevers, body aches and chills  History of Present Illness       Patient is c/o a 2 week history of fever, chills, body aches, congestion and cough  PT reports son's mother's family tested positive for Covid-19  Patient was initially evaluated in clinic on , tested negative for strep  No Covid-19 swab at that visit  Pt found out about positive cases a few days after then was tested  w/ negative test result  Pt reports his symptoms worsened a few days following testing negative  Pt has been w/ intermittent fevers, body aches and chills  Slight cough  Neck stiffness has improved  No N/V/D  Flu Symptoms  This is a new problem  The current episode started 1 to 4 weeks ago  The problem occurs constantly  The problem has been gradually worsening  Associated symptoms include chills, coughing, a fever and myalgias  Pertinent negatives include no abdominal pain, arthralgias, chest pain, rash, sore throat or vomiting  Review of Systems   Review of Systems   Constitutional: Positive for chills and fever  HENT: Negative for ear pain and sore throat  Eyes: Negative for pain and visual disturbance  Respiratory: Positive for cough  Negative for shortness of breath      Cardiovascular: Negative for chest pain and palpitations  Gastrointestinal: Negative for abdominal pain and vomiting  Genitourinary: Negative for dysuria and hematuria  Musculoskeletal: Positive for myalgias  Negative for arthralgias and back pain  Skin: Negative for color change and rash  Neurological: Negative for seizures and syncope  All other systems reviewed and are negative  Current Medications       Current Outpatient Medications:     albuterol (ProAir HFA) 90 mcg/act inhaler, Inhale 2 puffs every 6 (six) hours as needed for wheezing, Disp: 8 5 g, Rfl: 0    doxycycline (DORYX) 100 MG EC tablet, Take 1 tablet (100 mg total) by mouth 2 (two) times a day for 7 days, Disp: 14 tablet, Rfl: 0    ibuprofen (MOTRIN) 800 mg tablet, Take 1 tablet (800 mg total) by mouth every 8 (eight) hours, Disp: 30 tablet, Rfl: 0    methocarbamol (ROBAXIN) 500 mg tablet, Take 1 tablet (500 mg total) by mouth 4 (four) times a day, Disp: 30 tablet, Rfl: 0    naproxen (NAPROSYN) 500 mg tablet, Take 1 tablet (500 mg total) by mouth 2 (two) times a day with meals, Disp: 60 tablet, Rfl: 0    nicotine (NICODERM CQ) 21 mg/24 hr TD 24 hr patch, Place 1 patch on the skin every 24 hours (Patient not taking: Reported on 7/29/2021), Disp: 28 patch, Rfl: 0    Current Allergies     Allergies as of 08/06/2021    (No Known Allergies)            The following portions of the patient's history were reviewed and updated as appropriate: allergies, current medications, past family history, past medical history, past social history, past surgical history and problem list      History reviewed  No pertinent past medical history  History reviewed  No pertinent surgical history  History reviewed  No pertinent family history  Medications have been verified  Objective   Pulse (!) 120   Temp 100 4 °F (38 °C)   Resp 18   Ht 5' 10" (1 778 m)   Wt 89 4 kg (197 lb)   SpO2 98%   BMI 28 27 kg/m²   No LMP for male patient         Physical Exam Physical Exam  Constitutional:       Appearance: Normal appearance  He is normal weight  HENT:      Head: Normocephalic and atraumatic  Nose: Nose normal       Mouth/Throat:      Mouth: Mucous membranes are moist    Eyes:      Extraocular Movements: Extraocular movements intact  Conjunctiva/sclera: Conjunctivae normal       Pupils: Pupils are equal, round, and reactive to light  Cardiovascular:      Rate and Rhythm: Regular rhythm  Tachycardia present  Pulmonary:      Effort: Pulmonary effort is normal       Breath sounds: Rhonchi present  No rales  Musculoskeletal:         General: Normal range of motion  Cervical back: Normal range of motion and neck supple  Skin:     General: Skin is warm and dry  Neurological:      General: No focal deficit present  Mental Status: He is alert and oriented to person, place, and time     Psychiatric:         Mood and Affect: Mood normal          Behavior: Behavior normal

## 2021-08-06 NOTE — PATIENT INSTRUCTIONS
Monitor your symptoms, if symptoms worsen report to the ED  Increase oral hydration  Get plenty of rest   Take Motrin and Tylenol to reduce any fever/pain  [address]    Your healthcare provider and/or public health staff have evaluated you and have determined that you do not need to remain in the hospital at this time  At this time you can be isolated at home where you will be monitored by staff from your local or state health department  You should carefully follow the prevention and isolation steps below until a healthcare provider or local or state health department says that you can return to your normal activities  Stay home except to get medical care    People who are mildly ill with COVID-19 are able to isolate at home during their illness  You should restrict activities outside your home, except for getting medical care  Do not go to work, school, or public areas  Avoid using public transportation, ride-sharing, or taxis  Separate yourself from other people and animals in your home    People: As much as possible, you should stay in a specific room and away from other people in your home  Also, you should use a separate bathroom, if available  Animals: You should restrict contact with pets and other animals while you are sick with COVID-19, just like you would around other people  Although there have not been reports of pets or other animals becoming sick with COVID-19, it is still recommended that people sick with COVID-19 limit contact with animals until more information is known about the virus  When possible, have another member of your household care for your animals while you are sick  If you are sick with COVID-19, avoid contact with your pet, including petting, snuggling, being kissed or licked, and sharing food  If you must care for your pet or be around animals while you are sick, wash your hands before and after you interact with pets and wear a facemask   See COVID-19 and Animals for more information  Call ahead before visiting your doctor    If you have a medical appointment, call the healthcare provider and tell them that you have or may have COVID-19  This will help the healthcare providers office take steps to keep other people from getting infected or exposed  Wear a facemask    You should wear a facemask when you are around other people (e g , sharing a room or vehicle) or pets and before you enter a healthcare providers office  If you are not able to wear a facemask (for example, because it causes trouble breathing), then people who live with you should not stay in the same room with you, or they should wear a facemask if they enter your room  Cover your coughs and sneezes    Cover your mouth and nose with a tissue when you cough or sneeze  Throw used tissues in a lined trash can  Immediately wash your hands with soap and water for at least 20 seconds or, if soap and water are not available, clean your hands with an alcohol-based hand  that contains at least 60% alcohol  Clean your hands often    Wash your hands often with soap and water for at least 20 seconds, especially after blowing your nose, coughing, or sneezing; going to the bathroom; and before eating or preparing food  If soap and water are not readily available, use an alcohol-based hand  with at least 60% alcohol, covering all surfaces of your hands and rubbing them together until they feel dry  Soap and water are the best option if hands are visibly dirty  Avoid touching your eyes, nose, and mouth with unwashed hands  Avoid sharing personal household items    You should not share dishes, drinking glasses, cups, eating utensils, towels, or bedding with other people or pets in your home  After using these items, they should be washed thoroughly with soap and water      Clean all high-touch surfaces everyday    High touch surfaces include counters, tabletops, doorknobs, bathroom fixtures, toilets, phones, keyboards, tablets, and bedside tables  Also, clean any surfaces that may have blood, stool, or body fluids on them  Use a household cleaning spray or wipe, according to the label instructions  Labels contain instructions for safe and effective use of the cleaning product including precautions you should take when applying the product, such as wearing gloves and making sure you have good ventilation during use of the product  Monitor your symptoms    Seek prompt medical attention if your illness is worsening (e g , difficulty breathing)  Before seeking care, call your healthcare provider and tell them that you have, or are being evaluated for, COVID-19  Put on a facemask before you enter the facility  These steps will help the healthcare providers office to keep other people in the office or waiting room from getting infected or exposed  Ask your healthcare provider to call the local or [facility] health department  Persons who are placed under active monitoring or facilitated self-monitoring should follow instructions provided by their local health department or occupational health professionals, as appropriate  If you have a medical emergency and need to call 911, notify the dispatch personnel that you have, or are being evaluated for COVID-19  If possible, put on a facemask before emergency medical services arrive      Discontinuing home isolation    Patients with confirmed COVID-19 should remain under home isolation precautions until the following conditions are met:   - They have had no fever for at least 24 hours (that is one full day of no fever without the use medicine that reduces fevers)  AND  - other symptoms have improved (for example, when their cough or shortness of breath have improved)  AND  - If had mild or moderate illness, at least 10 days have passed since their symptoms first appeared or if severe illness (needed oxygen) or immunosuppressed, at least 20 days have passed since symptoms first appeared  Patients with confirmed COVID-19 should also notify close contacts (including their workplace) and ask that they self-quarantine  Currently, close contact is defined as being within 6 feet for 15 minutes or more from the period 24 hours starting 48 hours before symptom onset to the time at which the patient went into isolation  Close contacts of patients diagnosed with COVID-19 should be instructed by the patient to self-quarantine for 14 days from the last time of their last contact with the patient       Source: [employer]

## 2021-08-07 LAB — SARS-COV-2 RNA RESP QL NAA+PROBE: NEGATIVE

## 2021-08-11 ENCOUNTER — APPOINTMENT (OUTPATIENT)
Dept: LAB | Facility: MEDICAL CENTER | Age: 31
End: 2021-08-11
Payer: COMMERCIAL

## 2021-08-11 ENCOUNTER — APPOINTMENT (OUTPATIENT)
Dept: RADIOLOGY | Facility: CLINIC | Age: 31
End: 2021-08-11
Payer: COMMERCIAL

## 2021-08-11 ENCOUNTER — TELEPHONE (OUTPATIENT)
Dept: FAMILY MEDICINE CLINIC | Facility: CLINIC | Age: 31
End: 2021-08-11

## 2021-08-11 ENCOUNTER — TELEMEDICINE (OUTPATIENT)
Dept: FAMILY MEDICINE CLINIC | Facility: CLINIC | Age: 31
End: 2021-08-11
Payer: COMMERCIAL

## 2021-08-11 DIAGNOSIS — B34.9 ACUTE VIRAL SYNDROME: ICD-10-CM

## 2021-08-11 DIAGNOSIS — R79.89 ELEVATED LIVER FUNCTION TESTS: ICD-10-CM

## 2021-08-11 DIAGNOSIS — R50.81 FEVER IN OTHER DISEASES: ICD-10-CM

## 2021-08-11 DIAGNOSIS — R05.9 COUGH: ICD-10-CM

## 2021-08-11 DIAGNOSIS — R50.9 FEVER, UNSPECIFIED FEVER CAUSE: Primary | ICD-10-CM

## 2021-08-11 LAB
ALBUMIN SERPL BCP-MCNC: 3 G/DL (ref 3.5–5)
ALP SERPL-CCNC: 99 U/L (ref 46–116)
ALT SERPL W P-5'-P-CCNC: 197 U/L (ref 12–78)
ANION GAP SERPL CALCULATED.3IONS-SCNC: 4 MMOL/L (ref 4–13)
AST SERPL W P-5'-P-CCNC: 145 U/L (ref 5–45)
BILIRUB SERPL-MCNC: 0.49 MG/DL (ref 0.2–1)
BUN SERPL-MCNC: 8 MG/DL (ref 5–25)
CALCIUM ALBUM COR SERPL-MCNC: 9.3 MG/DL (ref 8.3–10.1)
CALCIUM SERPL-MCNC: 8.5 MG/DL (ref 8.3–10.1)
CHLORIDE SERPL-SCNC: 104 MMOL/L (ref 100–108)
CO2 SERPL-SCNC: 28 MMOL/L (ref 21–32)
CREAT SERPL-MCNC: 1.15 MG/DL (ref 0.6–1.3)
ERYTHROCYTE [DISTWIDTH] IN BLOOD BY AUTOMATED COUNT: 14.1 % (ref 11.6–15.1)
GFR SERPL CREATININE-BSD FRML MDRD: 84 ML/MIN/1.73SQ M
GLUCOSE SERPL-MCNC: 129 MG/DL (ref 65–140)
HCT VFR BLD AUTO: 42.2 % (ref 36.5–49.3)
HGB BLD-MCNC: 13.9 G/DL (ref 12–17)
MCH RBC QN AUTO: 29.1 PG (ref 26.8–34.3)
MCHC RBC AUTO-ENTMCNC: 32.9 G/DL (ref 31.4–37.4)
MCV RBC AUTO: 89 FL (ref 82–98)
PLATELET # BLD AUTO: 256 THOUSANDS/UL (ref 149–390)
PMV BLD AUTO: 9.5 FL (ref 8.9–12.7)
POTASSIUM SERPL-SCNC: 4 MMOL/L (ref 3.5–5.3)
PROT SERPL-MCNC: 6.7 G/DL (ref 6.4–8.2)
RBC # BLD AUTO: 4.77 MILLION/UL (ref 3.88–5.62)
SODIUM SERPL-SCNC: 136 MMOL/L (ref 136–145)
WBC # BLD AUTO: 10.43 THOUSAND/UL (ref 4.31–10.16)

## 2021-08-11 PROCEDURE — 80053 COMPREHEN METABOLIC PANEL: CPT

## 2021-08-11 PROCEDURE — 85027 COMPLETE CBC AUTOMATED: CPT

## 2021-08-11 PROCEDURE — 86618 LYME DISEASE ANTIBODY: CPT

## 2021-08-11 PROCEDURE — 86663 EPSTEIN-BARR ANTIBODY: CPT

## 2021-08-11 PROCEDURE — 71046 X-RAY EXAM CHEST 2 VIEWS: CPT

## 2021-08-11 PROCEDURE — 86644 CMV ANTIBODY: CPT

## 2021-08-11 PROCEDURE — 99213 OFFICE O/P EST LOW 20 MIN: CPT | Performed by: FAMILY MEDICINE

## 2021-08-11 PROCEDURE — 36415 COLL VENOUS BLD VENIPUNCTURE: CPT

## 2021-08-11 PROCEDURE — 86645 CMV ANTIBODY IGM: CPT

## 2021-08-11 PROCEDURE — 86664 EPSTEIN-BARR NUCLEAR ANTIGEN: CPT

## 2021-08-11 PROCEDURE — 4004F PT TOBACCO SCREEN RCVD TLK: CPT | Performed by: FAMILY MEDICINE

## 2021-08-11 PROCEDURE — 86665 EPSTEIN-BARR CAPSID VCA: CPT

## 2021-08-11 NOTE — PROGRESS NOTES
Virtual Regular Visit    Verification of patient location:    Patient is located in the following state in which I hold an active license [place]      Assessment/Plan:    Problem List Items Addressed This Visit        Other    Fever - Primary     Negative for COVID19 x 2; suspect viral given presentation; does not appear toxic via video visit; given continued fever check labs and CXR to r/o PNA; advised to start abx; f/u with results; c/w supportive care; f/u guidance given should sx worsen         Relevant Orders    CBC    Comprehensive metabolic panel    Lyme Antibody Profile with reflex to WB    EBV acute panel    CMV IgG/IgM Antibodies    XR chest pa & lateral      Other Visit Diagnoses     Acute viral syndrome        Cough                   Reason for visit is   Chief Complaint   Patient presents with    Virtual Regular Visit        Encounter provider Geraldine Stephenson DO    Provider located at [address] [facility] [address] [facility] [address]  [phone]      Recent Visits  No visits were found meeting these conditions  Showing recent visits within past 7 days and meeting all other requirements  Today's Visits  Date Type Provider Dept   08/11/21 Telemedicine DO [facility]   08/11/21 Telephone [facility]   Showing today's visits and meeting all other requirements  Future Appointments  No visits were found meeting these conditions  Showing future appointments within next 150 days and meeting all other requirements       The patient was identified by name and date of birth  Dahiana Calvillo was informed that this is a telemedicine visit and that the visit is being conducted through [address] [place] [address] Now and patient was informed that this is a secure, HIPAA-compliant platform  He agrees to proceed     My office door was closed  No one else was in the room    He acknowledged consent and understanding of privacy and security of the video platform  The patient has agreed to participate and understands they can discontinue the visit at any time  Patient is aware this is a billable service  Subjective  Willow Whittaker is a 32 y o  male  C/o fever   Patient is following up on persistent fever x 10 days  Per patient, he has been to urgent care x 2 and tested for COVID19 twice and both were negative  Pt is also fully vaccinated  Currently has fever, cough, and decreased appetite  He is currently taking Advil to help with fever  He has had fever for the last 10 days  Was as high as 103 but in am before medication is usually around 101 F  He did have a rash several weeks back where there was concern of Lyme and given doxycycline  He did not complete course  Pt states he did not start doxycyline since visit with urgent care on 8/6/21  Frustrated as the fever does not seem to be getting better  History reviewed  No pertinent past medical history  No past surgical history on file  Current Outpatient Medications   Medication Sig Dispense Refill    albuterol (ProAir HFA) 90 mcg/act inhaler Inhale 2 puffs every 6 (six) hours as needed for wheezing 8 5 g 0    doxycycline (DORYX) 100 MG EC tablet Take 1 tablet (100 mg total) by mouth 2 (two) times a day for 7 days 14 tablet 0    ibuprofen (MOTRIN) 800 mg tablet Take 1 tablet (800 mg total) by mouth every 8 (eight) hours 30 tablet 0    methocarbamol (ROBAXIN) 500 mg tablet Take 1 tablet (500 mg total) by mouth 4 (four) times a day 30 tablet 0    naproxen (NAPROSYN) 500 mg tablet Take 1 tablet (500 mg total) by mouth 2 (two) times a day with meals 60 tablet 0    nicotine (NICODERM CQ) 21 mg/24 hr TD 24 hr patch Place 1 patch on the skin every 24 hours (Patient not taking: Reported on 7/29/2021) 28 patch 0     No current facility-administered medications for this visit          No Known Allergies    Review of Systems   Constitutional: Positive for appetite change, chills, fatigue and fever  HENT: Negative for ear pain, sinus pressure and sinus pain  Respiratory: Positive for cough  Negative for chest tightness, shortness of breath and wheezing  Cardiovascular: Negative  Musculoskeletal: Positive for arthralgias  Video Exam    There were no vitals filed for this visit  Physical Exam  Vitals reviewed  Constitutional:       General: He is not in acute distress  Appearance: Normal appearance  He is not ill-appearing or toxic-appearing  HENT:      Head: Normocephalic and atraumatic  Eyes:      General: No scleral icterus  Right eye: No discharge  Left eye: No discharge  Conjunctiva/sclera: Conjunctivae normal    Pulmonary:      Effort: Pulmonary effort is normal    Neurological:      General: No focal deficit present  Mental Status: He is alert and oriented to person, place, and time  Psychiatric:         Mood and Affect: Mood normal          Behavior: Behavior normal          Thought Content: Thought content normal          Judgment: Judgment normal           I spent 12 minutes directly with the patient during this visit    VIRTUAL VISIT [address] verbally agrees to participate in [employer]  Pt is aware that [employer] could be limited without vital signs or the ability to perform a full hands-on physical exam  Balta Miner understands he or the provider may request at any time to terminate the video visit and request the patient to seek care or treatment in person

## 2021-08-11 NOTE — ASSESSMENT & PLAN NOTE
Negative for COVID19 x 2; suspect viral given presentation; does not appear toxic via video visit; given continued fever check labs and CXR to r/o PNA; advised to start abx; f/u with results; c/w supportive care; f/u guidance given should sx worsen

## 2021-08-12 LAB
B BURGDOR IGG+IGM SER-ACNC: 18
CMV IGG SERPL IA-ACNC: 8.2 U/ML (ref 0–0.59)
CMV IGM SERPL IA-ACNC: >240 AU/ML (ref 0–29.9)
EBV NA IGG SER IA-ACNC: 407 U/ML (ref 0–17.9)
EBV VCA IGG SER IA-ACNC: >600 U/ML (ref 0–17.9)
EBV VCA IGM SER IA-ACNC: 64.2 U/ML (ref 0–35.9)
INTERPRETATION: ABNORMAL

## 2021-08-25 ENCOUNTER — LAB (OUTPATIENT)
Dept: LAB | Facility: MEDICAL CENTER | Age: 31
End: 2021-08-25
Payer: COMMERCIAL

## 2021-08-25 DIAGNOSIS — E78.5 HYPERLIPIDEMIA, UNSPECIFIED HYPERLIPIDEMIA TYPE: ICD-10-CM

## 2021-08-25 DIAGNOSIS — R79.89 ELEVATED LIVER FUNCTION TESTS: ICD-10-CM

## 2021-08-25 LAB
ALBUMIN SERPL BCP-MCNC: 3.9 G/DL (ref 3.5–5)
ALP SERPL-CCNC: 93 U/L (ref 46–116)
ALT SERPL W P-5'-P-CCNC: 86 U/L (ref 12–78)
ANION GAP SERPL CALCULATED.3IONS-SCNC: 4 MMOL/L (ref 4–13)
AST SERPL W P-5'-P-CCNC: 40 U/L (ref 5–45)
BILIRUB SERPL-MCNC: 0.63 MG/DL (ref 0.2–1)
BUN SERPL-MCNC: 10 MG/DL (ref 5–25)
CALCIUM ALBUM COR SERPL-MCNC: 9.2 MG/DL (ref 8.3–10.1)
CALCIUM SERPL-MCNC: 9.1 MG/DL (ref 8.3–10.1)
CHLORIDE SERPL-SCNC: 104 MMOL/L (ref 100–108)
CHOLEST SERPL-MCNC: 201 MG/DL (ref 50–200)
CO2 SERPL-SCNC: 28 MMOL/L (ref 21–32)
CREAT SERPL-MCNC: 1.23 MG/DL (ref 0.6–1.3)
GFR SERPL CREATININE-BSD FRML MDRD: 78 ML/MIN/1.73SQ M
GLUCOSE SERPL-MCNC: 73 MG/DL (ref 65–140)
HDLC SERPL-MCNC: 27 MG/DL
LDLC SERPL CALC-MCNC: 130 MG/DL (ref 0–100)
NONHDLC SERPL-MCNC: 174 MG/DL
POTASSIUM SERPL-SCNC: 3.6 MMOL/L (ref 3.5–5.3)
PROT SERPL-MCNC: 7.6 G/DL (ref 6.4–8.2)
SODIUM SERPL-SCNC: 136 MMOL/L (ref 136–145)
TRIGL SERPL-MCNC: 218 MG/DL

## 2021-08-25 PROCEDURE — 80053 COMPREHEN METABOLIC PANEL: CPT

## 2021-08-25 PROCEDURE — 36415 COLL VENOUS BLD VENIPUNCTURE: CPT

## 2021-08-25 PROCEDURE — 80061 LIPID PANEL: CPT

## 2021-09-08 ENCOUNTER — APPOINTMENT (OUTPATIENT)
Dept: LAB | Facility: MEDICAL CENTER | Age: 31
End: 2021-09-08
Payer: COMMERCIAL

## 2021-09-08 DIAGNOSIS — R79.89 ELEVATED LIVER FUNCTION TESTS: ICD-10-CM

## 2021-09-08 LAB
ALBUMIN SERPL BCP-MCNC: 3.5 G/DL (ref 3.5–5)
ALP SERPL-CCNC: 79 U/L (ref 46–116)
ALT SERPL W P-5'-P-CCNC: 33 U/L (ref 12–78)
AST SERPL W P-5'-P-CCNC: 20 U/L (ref 5–45)
BILIRUB DIRECT SERPL-MCNC: 0.09 MG/DL (ref 0–0.2)
BILIRUB SERPL-MCNC: 0.39 MG/DL (ref 0.2–1)
PROT SERPL-MCNC: 6.7 G/DL (ref 6.4–8.2)

## 2021-09-08 PROCEDURE — 36415 COLL VENOUS BLD VENIPUNCTURE: CPT

## 2021-09-08 PROCEDURE — 80076 HEPATIC FUNCTION PANEL: CPT

## 2022-01-05 ENCOUNTER — TELEMEDICINE (OUTPATIENT)
Dept: FAMILY MEDICINE CLINIC | Facility: CLINIC | Age: 32
End: 2022-01-05
Payer: COMMERCIAL

## 2022-01-05 DIAGNOSIS — B34.9 VIRAL ILLNESS: Primary | ICD-10-CM

## 2022-01-05 PROCEDURE — 4004F PT TOBACCO SCREEN RCVD TLK: CPT | Performed by: NURSE PRACTITIONER

## 2022-01-05 PROCEDURE — 99213 OFFICE O/P EST LOW 20 MIN: CPT | Performed by: NURSE PRACTITIONER

## 2022-01-05 NOTE — PROGRESS NOTES
COVID-19 Outpatient Progress Note    Assessment/Plan:    Problem List Items Addressed This Visit     None      Visit Diagnoses     Viral illness    -  Primary    Relevant Orders    COVID Only- Collected at [facility] or [facility]         Disposition:     Referred patient to centralized site to test for COVID-19  CDC guidelines reviewed  Send for Covid swab  Isolate at home  Rest, hydrate  May continue OTC cold meds  Will f/u with results  Also [facility]  F/U sooner with any problems/concerns    I have spent 10 minutes directly with the patient  Greater than 50% of this time was spent in counseling/coordination of care regarding: instructions for management and risk factor reductions  Encounter provider TONIO Gonzales    Provider located at [place] [address]  [address] [facility] [address] [place] [address]  [phone]    Recent Visits  No visits were found meeting these conditions  Showing recent visits within past 7 days and meeting all other requirements  Future Appointments  No visits were found meeting these conditions  Showing future appointments within next 150 days and meeting all other requirements     This virtual check-in was done via [employer] and patient was informed that this is a secure, HIPAA-compliant platform  He agrees to proceed  Patient agrees to participate in a virtual check in via telephone or video visit instead of presenting to the office to address urgent/immediate medical needs  Patient is aware this is a billable service  After connecting through [facility], the patient was identified by name and date of birth  Taya Gauthier was informed that this was a telemedicine visit and that the exam was being conducted confidentially over secure lines  My office door was closed  No one else was in the room  Taya Gauthier acknowledged consent and understanding of privacy and security of the telemedicine visit   I informed the patient that I have reviewed his record in Epic and presented the opportunity for him to ask any questions regarding the visit today  The patient agreed to participate  Verification of patient location:  Patient is located in the following state in which I hold an active license: [place]    Subjective:   Mario Bañuelos is a 32 y o  male who is concerned about COVID-19  Patient's symptoms include chills, fatigue, myalgias and headache  Date of symptom onset: 1/3/2022  COVID-19 vaccination status: Fully vaccinated (primary series)    Exposure:   Contact with a person who is under investigation (PUI) for or who is positive for COVID-19 within the last 14 days?: No    Hospitalized recently for fever and/or lower respiratory symptoms?: No      Currently a healthcare worker that is involved in direct patient care?: No      Works in a special setting where the risk of COVID-19 transmission may be high? (this may include long-term care, correctional and jail facilities; homeless shelters; assisted-living facilities and group homes ): No      Resident in a special setting where the risk of COVID-19 transmission may be high? (this may include long-term care, correctional and jail facilities; homeless shelters; assisted-living facilities and group homes ): No      Symptoms started Monday  Resolving but still mildly present  Taking Dayquil  Brother with similar symptoms - awaiting Covid test results  They live together  Also works at [employer] - several active cases    Lab Results   Component Value Date    [address] [facility] [address] Negative 08/06/2021    [address] [facility] [address] Not Detected 11/21/2020    [address] [facility] Not Detected 12/22/2020     No past medical history on file  No past surgical history on file    Current Outpatient Medications   Medication Sig Dispense Refill    albuterol (ProAir HFA) 90 mcg/act inhaler Inhale 2 puffs every 6 (six) hours as needed for wheezing 8 5 g 0    ibuprofen (MOTRIN) 800 mg tablet Take 1 tablet (800 mg total) by mouth every 8 (eight) hours 30 tablet 0    methocarbamol (ROBAXIN) 500 mg tablet Take 1 tablet (500 mg total) by mouth 4 (four) times a day 30 tablet 0    naproxen (NAPROSYN) 500 mg tablet Take 1 tablet (500 mg total) by mouth 2 (two) times a day with meals 60 tablet 0    nicotine (NICODERM CQ) 21 mg/24 hr TD 24 hr patch Place 1 patch on the skin every 24 hours (Patient not taking: Reported on 7/29/2021) 28 patch 0     No current facility-administered medications for this visit  No Known Allergies    Review of Systems   Constitutional: Positive for chills and fatigue  Musculoskeletal: Positive for myalgias  Neurological: Positive for headaches  Objective: There were no vitals filed for this visit  Physical Exam  Constitutional:       General: He is not in acute distress  Appearance: Normal appearance  He is well-developed  He is not ill-appearing  Pulmonary:      Effort: Pulmonary effort is normal  No respiratory distress  Neurological:      Mental Status: He is alert and oriented to person, place, and time  Psychiatric:         Attention and Perception: Attention normal          Mood and Affect: Mood normal          Speech: Speech normal          Behavior: Behavior normal          VIRTUAL VISIT DISCLAIMER    Jeremiah Formanremy verbally agrees to participate in [employer]  Pt is aware that [employer] could be limited without vital signs or the ability to perform a full hands-on physical exam  Balta Wilkes understands he or the provider may request at any time to terminate the video visit and request the patient to seek care or treatment in person

## 2022-09-11 ENCOUNTER — OFFICE VISIT (OUTPATIENT)
Dept: URGENT CARE | Facility: MEDICAL CENTER | Age: 32
End: 2022-09-11
Payer: COMMERCIAL

## 2022-09-11 VITALS
SYSTOLIC BLOOD PRESSURE: 150 MMHG | DIASTOLIC BLOOD PRESSURE: 90 MMHG | HEART RATE: 107 BPM | TEMPERATURE: 98.2 F | OXYGEN SATURATION: 100 % | RESPIRATION RATE: 18 BRPM

## 2022-09-11 DIAGNOSIS — S01.01XA LACERATION OF SCALP WITHOUT FOREIGN BODY, INITIAL ENCOUNTER: Primary | ICD-10-CM

## 2022-09-11 PROCEDURE — 99213 OFFICE O/P EST LOW 20 MIN: CPT | Performed by: PHYSICIAN ASSISTANT

## 2022-09-11 PROCEDURE — 12002 RPR S/N/AX/GEN/TRNK2.6-7.5CM: CPT | Performed by: PHYSICIAN ASSISTANT

## 2022-09-11 PROCEDURE — S9083 URGENT CARE CENTER GLOBAL: HCPCS | Performed by: PHYSICIAN ASSISTANT

## 2022-09-11 PROCEDURE — G0382 LEV 3 HOSP TYPE B ED VISIT: HCPCS | Performed by: PHYSICIAN ASSISTANT

## 2022-09-11 NOTE — PROGRESS NOTES
[facility]        NAME: Vanita Ronquillo is a 28 y o  male  : 1990    MRN: [id number]  DATE: 2022  TIME: 4:06 PM    Assessment and Plan   Laceration of scalp without foreign body, initial encounter [S01 01XA]  1  Laceration of scalp without foreign body, initial encounter  Laceration repair         Patient Instructions       Follow up for staples out in 5-7 days  Chief Complaint     Chief Complaint   Patient presents with    Laceration     Pt woke up this morning noticing blood on his pillow from a head laceration front left side  Pt drank a considerable amount of tequila last night and does not remember much, believes the laceration happened in the bathroom  History of Present Illness       Patient has sustained a laceration to the left side of his head  No loss of consciousness  He was drinking  He feels fine today except for the bleeding from the left side of his head  He denies any dizziness, headache, visual disturbance  Review of Systems   Review of Systems   All other systems reviewed and are negative          Current Medications       Current Outpatient Medications:     albuterol (ProAir HFA) 90 mcg/act inhaler, Inhale 2 puffs every 6 (six) hours as needed for wheezing (Patient not taking: Reported on 2022), Disp: 8 5 g, Rfl: 0    ibuprofen (MOTRIN) 800 mg tablet, Take 1 tablet (800 mg total) by mouth every 8 (eight) hours (Patient not taking: Reported on 2022), Disp: 30 tablet, Rfl: 0    methocarbamol (ROBAXIN) 500 mg tablet, Take 1 tablet (500 mg total) by mouth 4 (four) times a day (Patient not taking: Reported on 2022), Disp: 30 tablet, Rfl: 0    naproxen (NAPROSYN) 500 mg tablet, Take 1 tablet (500 mg total) by mouth 2 (two) times a day with meals (Patient not taking: Reported on 2022), Disp: 60 tablet, Rfl: 0    nicotine (NICODERM CQ) 21 mg/24 hr TD 24 hr patch, Place 1 patch on the skin every 24 hours (Patient not taking: No sig reported), Disp: 28 patch, Rfl: 0    Current Allergies     Allergies as of 09/11/2022    (No Known Allergies)            The following portions of the patient's history were reviewed and updated as appropriate: allergies, current medications, past family history, past medical history, past social history, past surgical history and problem list      History reviewed  No pertinent past medical history  History reviewed  No pertinent surgical history  History reviewed  No pertinent family history  Medications have been verified  Objective   /90   Pulse (!) 107   Temp 98 2 °F (36 8 °C)   Resp 18   SpO2 100%   No LMP for male patient  Physical Exam     Physical Exam  Vitals and nursing note reviewed  Constitutional:       Appearance: Normal appearance  He is normal weight  Cardiovascular:      Rate and Rhythm: Normal rate and regular rhythm  Pulses: Normal pulses  Heart sounds: Normal heart sounds  Pulmonary:      Effort: Pulmonary effort is normal       Breath sounds: Normal breath sounds  Neurological:      Mental Status: He is alert  Left side of scalp 3 in laceration    Laceration repair    Date/Time: 9/11/2022 4:05 PM  Performed by: Luis Dao PA-C  Authorized by: Luis Dao PA-C   Consent: Verbal consent obtained  Risks and benefits: risks, benefits and alternatives were discussed  Consent given by: patient  Patient understanding: patient states understanding of the procedure being performed  Patient consent: the patient's understanding of the procedure matches consent given  Patient identity confirmed: verbally with patient  Time out: Immediately prior to procedure a "time out" was called to verify the correct patient, procedure, equipment, support staff and site/side marked as required    Body area: head/neck  Location details: scalp  Laceration length: 7 cm  Foreign bodies: no foreign bodies  Tendon involvement: none  Nerve involvement: none  Vascular damage: no  Anesthesia: local infiltration    Anesthesia:  Local Anesthetic: lidocaine 1% with epinephrine  Anesthetic total: 4 mL    Sedation:  Patient sedated: no      Wound Dehiscence:  Superficial Wound Dehiscence: simple closure      Procedure Details:  Preparation: Patient was prepped and draped in the usual sterile fashion    Irrigation solution: saline  Amount of cleaning: standard  Debridement: none  Degree of undermining: none  Skin closure: staples  Number of sutures: 7  Technique: simple  Approximation: loose  Approximation difficulty: simple  Dressing: antibiotic ointment  Patient tolerance: patient tolerated the procedure well with no immediate complications

## 2022-09-17 ENCOUNTER — OFFICE VISIT (OUTPATIENT)
Dept: URGENT CARE | Facility: MEDICAL CENTER | Age: 32
End: 2022-09-17
Payer: COMMERCIAL

## 2022-09-17 VITALS
RESPIRATION RATE: 20 BRPM | BODY MASS INDEX: 28.27 KG/M2 | HEART RATE: 104 BPM | TEMPERATURE: 97.4 F | WEIGHT: 197 LBS | OXYGEN SATURATION: 99 % | SYSTOLIC BLOOD PRESSURE: 150 MMHG | DIASTOLIC BLOOD PRESSURE: 100 MMHG

## 2022-09-17 DIAGNOSIS — Z48.02 ENCOUNTER FOR STAPLE REMOVAL: Primary | ICD-10-CM

## 2022-09-17 PROCEDURE — 99213 OFFICE O/P EST LOW 20 MIN: CPT | Performed by: EMERGENCY MEDICINE

## 2022-09-17 NOTE — PROGRESS NOTES
[facility]        NAME: Audrey Combs is a 28 y o  male  : 1990    MRN: [id number]  DATE: 2022  TIME: 11:43 AM    Assessment and Plan   Encounter for staple removal [Z48 02]  1  Encounter for staple removal       Suture removal    Date/Time: 2022 11:46 AM  Performed by: Lindsey Perez DO  Authorized by: Mike Valencia DO   Universal Protocol:  Consent: Verbal consent obtained  Risks and benefits: risks, benefits and alternatives were discussed  Consent given by: patient  Patient understanding: patient states understanding of the procedure being performed        Patient location:  Clinic  Location:     Location:   [facility] location:  Scalp  Procedure details:     Nail bed suture material: staple removal     Wound appearance:  No sign(s) of infection, good wound healing and clean    Number of staples removed:  7  Post-procedure details:     Patient tolerance of procedure: Tolerated well, no immediate complications        Patient Instructions       Follow up with PCP in 3-5 days  Proceed to  ER if symptoms worsen  Chief Complaint     Chief Complaint   Patient presents with    Suture / Staple Removal     Patient here for staple removal from head  History of Present Illness       29 y/o male presents today for staple removal   Staples placed one week ago for a head injury sustained at a wedding  Patient does not remember what happened  Review of Systems   Review of Systems   Constitutional: Negative for chills, fatigue and fever  HENT: Negative for postnasal drip, sore throat and trouble swallowing  Respiratory: Negative for chest tightness and shortness of breath  Gastrointestinal: Negative for abdominal pain  Genitourinary: Negative for dysuria  Skin: Negative for rash  Allergic/Immunologic: Negative for immunocompromised state  Neurological: Negative for dizziness, light-headedness and headaches     Psychiatric/Behavioral: Negative for confusion  Current Medications       Current Outpatient Medications:     albuterol (ProAir HFA) 90 mcg/act inhaler, Inhale 2 puffs every 6 (six) hours as needed for wheezing, Disp: 8 5 g, Rfl: 0    ibuprofen (MOTRIN) 800 mg tablet, Take 1 tablet (800 mg total) by mouth every 8 (eight) hours, Disp: 30 tablet, Rfl: 0    methocarbamol (ROBAXIN) 500 mg tablet, Take 1 tablet (500 mg total) by mouth 4 (four) times a day, Disp: 30 tablet, Rfl: 0    naproxen (NAPROSYN) 500 mg tablet, Take 1 tablet (500 mg total) by mouth 2 (two) times a day with meals, Disp: 60 tablet, Rfl: 0    nicotine (NICODERM CQ) 21 mg/24 hr TD 24 hr patch, Place 1 patch on the skin every 24 hours, Disp: 28 patch, Rfl: 0    Current Allergies     Allergies as of 09/17/2022    (No Known Allergies)            The following portions of the patient's history were reviewed and updated as appropriate: allergies, current medications, past family history, past medical history, past social history, past surgical history and problem list      History reviewed  No pertinent past medical history  History reviewed  No pertinent surgical history  History reviewed  No pertinent family history  Medications have been verified  Objective   /100   Pulse 104   Temp (!) 97 4 °F (36 3 °C)   Resp 20   Wt 89 4 kg (197 lb)   SpO2 99%   BMI 28 27 kg/m²        Physical Exam     Physical Exam  Vitals and nursing note reviewed  Constitutional:       Appearance: Normal appearance  HENT:      Head: Normocephalic  Comments: Linear laceration left parietal scalp  Well healed  Musculoskeletal:         General: Normal range of motion  Skin:     General: Skin is warm and dry  Capillary Refill: Capillary refill takes less than 2 seconds  Neurological:      General: No focal deficit present  Mental Status: He is alert and oriented to person, place, and time     Psychiatric:         Mood and Affect: Mood normal  Behavior: Behavior normal

## 2022-10-12 PROBLEM — R50.9 FEVER: Status: RESOLVED | Noted: 2021-08-11 | Resolved: 2022-10-12

## 2023-12-05 ENCOUNTER — TELEPHONE (OUTPATIENT)
Dept: FAMILY MEDICINE CLINIC | Facility: CLINIC | Age: 33
End: 2023-12-05

## 2023-12-05 NOTE — TELEPHONE ENCOUNTER
LVM for pt requesting a c/b to confirm if still a pt with [facility] and if so, to schedule overdue Annual Physical.

## 2024-07-16 ENCOUNTER — OFFICE VISIT (OUTPATIENT)
Dept: FAMILY MEDICINE CLINIC | Facility: CLINIC | Age: 34
End: 2024-07-16
Payer: COMMERCIAL

## 2024-07-16 VITALS
OXYGEN SATURATION: 98 % | HEART RATE: 101 BPM | WEIGHT: 201.2 LBS | HEIGHT: 71 IN | TEMPERATURE: 97.8 F | BODY MASS INDEX: 28.17 KG/M2 | DIASTOLIC BLOOD PRESSURE: 86 MMHG | SYSTOLIC BLOOD PRESSURE: 138 MMHG

## 2024-07-16 DIAGNOSIS — R53.83 FATIGUE, UNSPECIFIED TYPE: ICD-10-CM

## 2024-07-16 DIAGNOSIS — G47.30 SLEEP APNEA, UNSPECIFIED TYPE: Primary | ICD-10-CM

## 2024-07-16 DIAGNOSIS — Z13.6 ENCOUNTER FOR LIPID SCREENING FOR CARDIOVASCULAR DISEASE: ICD-10-CM

## 2024-07-16 DIAGNOSIS — Z13.1 DIABETES MELLITUS SCREENING: ICD-10-CM

## 2024-07-16 DIAGNOSIS — Z13.220 ENCOUNTER FOR LIPID SCREENING FOR CARDIOVASCULAR DISEASE: ICD-10-CM

## 2024-07-16 PROCEDURE — 99213 OFFICE O/P EST LOW 20 MIN: CPT | Performed by: FAMILY MEDICINE

## 2024-07-16 NOTE — PROGRESS NOTES
"Ambulatory Visit  Name: Balta Frey      : 1990      MRN: [id number]  Encounter Provider: Monica Arellano DO  Encounter Date: 2024   Encounter department: [facility]    Assessment & Plan   1. Sleep apnea, unspecified type  -     Ambulatory Referral to Sleep Medicine; Future  2. Encounter for lipid screening for cardiovascular disease  -     Lipid Panel with Direct LDL reflex; Future; Expected date: 2024  3. Diabetes mellitus screening  -     Comprehensive metabolic panel; Future; Expected date: 2024  4. Fatigue, unspecified type  -     Ambulatory Referral to Sleep Medicine; Future  -     CBC and differential; Future; Expected date: 2024     History of Present Illness     Patient snores loud. Alsways tired in the morning. Wakes up gasping for air.    He has been told that he stops breathing in his sleep.  Patient has been noting tachycardia on his watch at night.  He has been drinking alcohol 3 to 6 drinks a night.  Smoking 1 ppd x 16 years.        Review of Systems   Constitutional:  Positive for fatigue.   Respiratory:  Positive for apnea.        Objective     /86 (BP Location: Left arm, Patient Position: Sitting, Cuff Size: Adult)   Pulse 101   Temp 97.8 °F (36.6 °C)   Ht 5' 11\" (1.803 m)   Wt 91.3 kg (201 lb 3.2 oz)   SpO2 98%   BMI 28.06 kg/m²     Physical Exam  Vitals and nursing note reviewed.   Constitutional:       General: He is not in acute distress.  HENT:      Head: Normocephalic.   Neck:      Thyroid: No thyromegaly.   Cardiovascular:      Rate and Rhythm: Normal rate and regular rhythm.      Heart sounds: Normal heart sounds.   Pulmonary:      Effort: Pulmonary effort is normal.      Breath sounds: Normal breath sounds.   Musculoskeletal:      Right lower leg: No edema.      Left lower leg: No edema.   Lymphadenopathy:      Cervical: No cervical adenopathy.   Skin:     General: Skin is warm and dry.   Neurological:      Mental Status: He " is alert and oriented to person, place, and time.   Psychiatric:         Mood and Affect: Mood normal.       Administrative Statements

## 2024-07-20 ENCOUNTER — APPOINTMENT (OUTPATIENT)
Dept: LAB | Facility: CLINIC | Age: 34
End: 2024-07-20
Payer: COMMERCIAL

## 2024-07-20 DIAGNOSIS — Z13.1 DIABETES MELLITUS SCREENING: ICD-10-CM

## 2024-07-20 DIAGNOSIS — Z13.6 ENCOUNTER FOR LIPID SCREENING FOR CARDIOVASCULAR DISEASE: ICD-10-CM

## 2024-07-20 DIAGNOSIS — R53.83 FATIGUE, UNSPECIFIED TYPE: ICD-10-CM

## 2024-07-20 DIAGNOSIS — Z13.220 ENCOUNTER FOR LIPID SCREENING FOR CARDIOVASCULAR DISEASE: ICD-10-CM

## 2024-07-20 LAB
ALBUMIN SERPL BCG-MCNC: 4.2 G/DL (ref 3.5–5)
ALP SERPL-CCNC: 66 U/L (ref 34–104)
ALT SERPL W P-5'-P-CCNC: 43 U/L (ref 7–52)
ANION GAP SERPL CALCULATED.3IONS-SCNC: 12 MMOL/L (ref 4–13)
AST SERPL W P-5'-P-CCNC: 29 U/L (ref 13–39)
BASOPHILS # BLD AUTO: 0.08 THOUSANDS/ÂΜL (ref 0–0.1)
BASOPHILS NFR BLD AUTO: 1 % (ref 0–1)
BILIRUB SERPL-MCNC: 0.43 MG/DL (ref 0.2–1)
BUN SERPL-MCNC: 7 MG/DL (ref 5–25)
CALCIUM SERPL-MCNC: 9.5 MG/DL (ref 8.4–10.2)
CHLORIDE SERPL-SCNC: 106 MMOL/L (ref 96–108)
CHOLEST SERPL-MCNC: 205 MG/DL
CO2 SERPL-SCNC: 25 MMOL/L (ref 21–32)
CREAT SERPL-MCNC: 1.03 MG/DL (ref 0.6–1.3)
EOSINOPHIL # BLD AUTO: 0.32 THOUSAND/ÂΜL (ref 0–0.61)
EOSINOPHIL NFR BLD AUTO: 3 % (ref 0–6)
ERYTHROCYTE [DISTWIDTH] IN BLOOD BY AUTOMATED COUNT: 13.5 % (ref 11.6–15.1)
GFR SERPL CREATININE-BSD FRML MDRD: 94 ML/MIN/1.73SQ M
GLUCOSE P FAST SERPL-MCNC: 74 MG/DL (ref 65–99)
HCT VFR BLD AUTO: 48.4 % (ref 36.5–49.3)
HDLC SERPL-MCNC: 31 MG/DL
HGB BLD-MCNC: 16.2 G/DL (ref 12–17)
IMM GRANULOCYTES # BLD AUTO: 0.05 THOUSAND/UL (ref 0–0.2)
IMM GRANULOCYTES NFR BLD AUTO: 0 % (ref 0–2)
LDLC SERPL CALC-MCNC: 133 MG/DL (ref 0–100)
LYMPHOCYTES # BLD AUTO: 3.72 THOUSANDS/ÂΜL (ref 0.6–4.47)
LYMPHOCYTES NFR BLD AUTO: 32 % (ref 14–44)
MCH RBC QN AUTO: 31.4 PG (ref 26.8–34.3)
MCHC RBC AUTO-ENTMCNC: 33.5 G/DL (ref 31.4–37.4)
MCV RBC AUTO: 94 FL (ref 82–98)
MONOCYTES # BLD AUTO: 0.85 THOUSAND/ÂΜL (ref 0.17–1.22)
MONOCYTES NFR BLD AUTO: 7 % (ref 4–12)
NEUTROPHILS # BLD AUTO: 6.71 THOUSANDS/ÂΜL (ref 1.85–7.62)
NEUTS SEG NFR BLD AUTO: 57 % (ref 43–75)
NRBC BLD AUTO-RTO: 0 /100 WBCS
PLATELET # BLD AUTO: 322 THOUSANDS/UL (ref 149–390)
PMV BLD AUTO: 9.9 FL (ref 8.9–12.7)
POTASSIUM SERPL-SCNC: 3.7 MMOL/L (ref 3.5–5.3)
PROT SERPL-MCNC: 6.9 G/DL (ref 6.4–8.4)
RBC # BLD AUTO: 5.16 MILLION/UL (ref 3.88–5.62)
SODIUM SERPL-SCNC: 143 MMOL/L (ref 135–147)
TRIGL SERPL-MCNC: 203 MG/DL
WBC # BLD AUTO: 11.73 THOUSAND/UL (ref 4.31–10.16)

## 2024-07-20 PROCEDURE — 80053 COMPREHEN METABOLIC PANEL: CPT

## 2024-07-20 PROCEDURE — 85025 COMPLETE CBC W/AUTO DIFF WBC: CPT

## 2024-07-20 PROCEDURE — 80061 LIPID PANEL: CPT

## 2024-07-20 PROCEDURE — 36415 COLL VENOUS BLD VENIPUNCTURE: CPT

## 2024-09-30 ENCOUNTER — OFFICE VISIT (OUTPATIENT)
Dept: SLEEP CENTER | Facility: CLINIC | Age: 34
End: 2024-09-30
Payer: COMMERCIAL

## 2024-09-30 VITALS
HEART RATE: 88 BPM | BODY MASS INDEX: 27.86 KG/M2 | SYSTOLIC BLOOD PRESSURE: 130 MMHG | WEIGHT: 199 LBS | HEIGHT: 71 IN | DIASTOLIC BLOOD PRESSURE: 85 MMHG

## 2024-09-30 DIAGNOSIS — R06.83 SNORING: ICD-10-CM

## 2024-09-30 DIAGNOSIS — G47.39 SLEEP APNEA-LIKE BEHAVIOR: Primary | ICD-10-CM

## 2024-09-30 DIAGNOSIS — R53.83 FATIGUE, UNSPECIFIED TYPE: ICD-10-CM

## 2024-09-30 DIAGNOSIS — G47.19 EXCESSIVE DAYTIME SLEEPINESS: ICD-10-CM

## 2024-09-30 PROCEDURE — 99203 OFFICE O/P NEW LOW 30 MIN: CPT | Performed by: INTERNAL MEDICINE

## 2024-09-30 NOTE — PROGRESS NOTES
[facility]  Sleep Medicine New Patient Evaluation    PATIENT NAME: Balta Frey  DATE OF SERVICE: September 30, 2024    CONSULTING PROVIDER:  Monica Arellano DO  [address]  [address]  [place] [address]      Assessment/Plan:  1. Snoring  Assessment & Plan:  Patient having loud snoring, gasping for air in middle of night, witnessed apneas which are concerning for sleep apnea. Patient BMI would not be associated with sleep apnea but patient has Mallampati score 4, which can be linked with sleep apnea. Patient's smoking can also contribute to sleep apnea. Patient overall has high pretest probability for sleep apnea.    Plan:  Discussed with Dr. Mireles  Discussed with patient the pathophysiology of OSAS and medical conditions associated with OSAS such as DM, HTN, CAD, Depression, Stroke, Headache.  Treatment options including surgery, Dental appliances, positional therapy, and CPAP were discussed.  I have ordered a Home Sleep Apnea Test (HSAT) to evaluate for sleep-disordered breathing. Should this be negative/inconclusive, due to the known limitations of HSAT, I will order an in lab polysomnogram (PSG) to ensure that sleep apnea is not present.  Advised patient to avoid activities that could harm self or others when tired/sleepy, including driving.  Encouraged maintaining normal weight  Discussed importance of good sleep hygiene.  Discussed about stopping smoking due to worsening possible sleep disordered breathing and about stopping alcohol use due to affecting sleep fragmentation in the long term; Patient agreeable and is motivated to stop smoking and alcohol use.  Pending the results of the sleep study, we will plan to order CPAP with a subsequent compliance follow-up.   Followup after sleep study  Patient agreeable to above.  Orders:  -     Ambulatory Referral to Sleep Medicine  -     Home Study; Future  2. Fatigue, unspecified type  -     Ambulatory Referral to Sleep Medicine       Thank  you for allowing me to participate in the care of your patient.  If there are any questions regarding evaluation please feel free to reach out.       ________________________________________________________________________________________________    HPI: Balta Frey is a 34 y.o. male with no previous reported significant PMHx who presents for concern for sleep apnea. He had loud snoring, gasping for air in middle of night, witnessed apneas which was noted by someone he was seeing around a year ago (does not see someone at this time). Patient feels groggy and unrefreshed during the morning and dry mouth. Patient had it for a couple years. No problems with nasal passage, oral passage, or throat and no congenital defects. No significant nasal congestion. Reports GERD during the day. Dad had sleep apnea.       [place] Sleepiness Scale:  What are your chances of dozing?   0= no chance  1= slight chance  2= moderate chance  3= high chance    Sitting and readin   Watching TV: 1  Sitting, inactive in a public place (e.g. a theatre or a meeting):0  As a passenger in a car for an hour without a break: 0  Lying down to rest in the afternoon when circumstances permit: 1   Sitting and talking to someone: 0  Sitting quietly after a lunch without alcohol: 0  In a car, while stopped for a few minutes in the traffic: 0       TOTAL  2/24  Greater or equal to 10 is positive for excessive daytime sleepiness    Pertinent Medications: Used to take melatonin for a month which was 5 mg right before going to bed.       SLEEP-WAKE SCHEDULE  He is self-described as a night person.  Sleep Schedule:  Weekdays:  Bedtime:10:00PM   Asleep in 31-45 minutes without melatonin. Patient laydown and watch television to fall asleep; some air conditioning. Does not look at the clock  Nighttime awakenings: 0     Wake: 6:00AM, need to use alarm    Naps: 1-2 times per week, 90 minutes, refreshing    Average total sleep time (in a 24 hour period): 6- 7  hours.    Weekends:   Bedtime:12:00AM   Asleep in using 3-4 cans of beer before sleeping 5-10 minutes   Nighttime awakenings: 9:00AM     Wake: 9AM    Naps: 1-2 times per week    Average total sleep time (in a 24 hour period): 6 hours.    Sleep-Related Details:  Preferred Sleep Position: side(s)  SDB Symptoms: snoring, witnessed apneas, gasping/choking, dry mouth/nose, and waking unrefreshed  Bruxism: No  Nocturnal GERD: No during daytime   Nocturnal Palpitations: No  Nocturnal Anxiety or Rumination: Ramdom thoughts before going to bed  Sleep-Related Hallucinations: No   Sleep Paralysis: No   Cataplexy: No     RLS Symptoms: No  PLM Symptoms: Not reported  History of iron deficiency: No    He denies any parasomnia activity.    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    He does not have difficulty with memory or concentration.      OTHER RELEVANT LABS AND STUDIES:  CO2 7/20/2024 25      History reviewed. No pertinent past medical history. History reviewed. No pertinent surgical history.   Patient Active Problem List   Diagnosis    Snoring      Allergies as of 09/30/2024    (No Known Allergies)         Review of Symptoms:    Review of Systems   Respiratory:  Negative for chest tightness and shortness of breath.    Cardiovascular:  Negative for chest pain and palpitations.   Gastrointestinal:  Negative for constipation and diarrhea.   Endocrine: Negative for polyuria.   Musculoskeletal:  Negative for arthralgias, back pain and gait problem.   Skin:  Negative for rash.   Neurological:  Negative  "for weakness, numbness and headaches.   Psychiatric/Behavioral:  Negative for dysphoric mood. The patient is not nervous/anxious.      10-point system review completed, all of which are negative except as mentioned above.    CURRENT MEDICATIONS:  Current Outpatient Medications   Medication Instructions    albuterol (ProAir HFA) 90 mcg/act inhaler 2 puffs, Inhalation, Every 6 hours PRN    ibuprofen (MOTRIN) 800 mg, Oral, Every 8 hours scheduled    methocarbamol (ROBAXIN) 500 mg, Oral, 4 times daily    naproxen (NAPROSYN) 500 mg, Oral, 2 times daily with meals    nicotine (NICODERM CQ) 21 mg/24 hr TD 24 hr patch 1 patch, Transdermal, Every 24 hours         SOCIAL HISTORY:  Social History     Tobacco Use    Smoking status: Every Day     Current packs/day: 1.00     Average packs/day: 1 pack/day for 16.2 years (16.2 ttl pk-yrs)     Types: Cigarettes     Start date: 7/16/2008    Smokeless tobacco: Never   Vaping Use    Vaping status: Former    Substances: Nicotine   Substance Use Topics    Alcohol use: Yes     Alcohol/week: 24.0 standard drinks of alcohol     Types: 24 Standard drinks or equivalent per week    Drug use: Never       FAMILY HISTORY:  Family History   Problem Relation Age of Onset    Hypertension Father     Anxiety disorder Son       Family History of Sleep Disorders: Uncle has sleep apnea        PHYSICAL EXAMINATION:  Vital Signs:  /85 (BP Location: Left arm, Patient Position: Sitting, Cuff Size: Large)   Pulse 88   Ht 5' 11\" (1.803 m)   Wt 90.3 kg (199 lb)   BMI 27.75 kg/m²  Body mass index is 27.75 kg/m².    Constitutional: NAD, well appearing   Mental Status: AAOx3  Neck Circumference: Neck Circumference: 16.25 inches  Skin: Warm, dry, no rashes noted   Eyes: PERRL, normal conjunctiva  ENT: Nasal congestion absent  Posterior Airspace:   Mallampati Score: Class 4  Retrognathia: absent  Overbite: present (yellowed teeth)  High Arched Palate: absent  Tongue Scalloping/Ridging: present  Uvula: " normal  Chest: RRR, +S1/S2, CTA B/L, no W/R/R  Abdomen: Soft, NT/ND  Extremities: No digital clubbing or pedal edema    NEUROLOGICAL EXAM:  General: Awake, alert, speech fluent, comprehension, naming, repetition intact. Short and long term memory intact.  CN: PERRL, EOMI without nystagmus, facial sensation and strength are normal and symmetric, hearing is intact to conversational tone, palate and tongue movements are intact and symmetric.  Motor: Normal tone, bulk and strength bilaterally.   Sensation: LT intact throughout.  Gait: Able to walk without difficulty. Stance normal.      I have spent a total time of 40 minutes on 09/30/24 in caring for this patient including Diagnostic results, Prognosis, Risks and benefits of tx options, Instructions for management, Patient and family education, Importance of tx compliance, Risk factor reductions, Documenting in the medical record, Reviewing / ordering tests, medicine, procedures  , and Obtaining or reviewing history  .        Electronically signed by:    Rico Pa MD  Sleep Medicine Fellow  [facility]  09/30/24

## 2024-09-30 NOTE — ASSESSMENT & PLAN NOTE
Patient having loud snoring, gasping for air in middle of night, witnessed apneas which are concerning for sleep apnea. Patient BMI would not be associated with sleep apnea but patient has Mallampati score 4, which can be linked with sleep apnea. Patient's smoking can also contribute to sleep apnea. Patient overall has high pretest probability for sleep apnea.    Plan:  Discussed with Dr. Mireles  Discussed with patient the pathophysiology of OSAS and medical conditions associated with OSAS such as DM, HTN, CAD, Depression, Stroke, Headache.  Treatment options including surgery, Dental appliances, positional therapy, and CPAP were discussed.  I have ordered a Home Sleep Apnea Test (HSAT) to evaluate for sleep-disordered breathing. Should this be negative/inconclusive, due to the known limitations of HSAT, I will order an in lab polysomnogram (PSG) to ensure that sleep apnea is not present.  Advised patient to avoid activities that could harm self or others when tired/sleepy, including driving.  Encouraged maintaining normal weight  Discussed importance of good sleep hygiene.  Discussed about stopping smoking due to worsening possible sleep disordered breathing and about stopping alcohol use due to affecting sleep fragmentation in the long term; Patient agreeable and is motivated to stop smoking and alcohol use.  Pending the results of the sleep study, we will plan to order CPAP with a subsequent compliance follow-up.   Followup after sleep study  Patient agreeable to above.

## 2024-10-16 ENCOUNTER — HOSPITAL ENCOUNTER (OUTPATIENT)
Dept: SLEEP CENTER | Facility: CLINIC | Age: 34
Discharge: HOME | End: 2024-10-16
Payer: COMMERCIAL

## 2024-10-16 DIAGNOSIS — R06.83 SNORING: ICD-10-CM

## 2024-10-16 PROCEDURE — G0399 HOME SLEEP TEST/TYPE 3 PORTA: HCPCS

## 2024-10-16 NOTE — PROGRESS NOTES
Home Sleep Study Documentation    HOME STUDY DEVICE: Noxturnal no                                           Jody G3 yes device # 13      Pre-Sleep Home Study:    Set-up and instructions performed by: Cierra    Technician performed demonstration for Patient: yes    Return demonstration performed by Patient: yes    Written instructions provided to Patient: yes    Patient signed consent form: yes        Post-Sleep Home Study:    Additional comments by Patient:      Home Sleep Study Failed:no:    Failure reason: N/A    Reported or Detected: N/A    Scored by: CONOR Jennings

## 2024-10-18 PROBLEM — G47.33 OSA (OBSTRUCTIVE SLEEP APNEA): Status: ACTIVE | Noted: 2024-10-18

## 2024-10-26 ENCOUNTER — OFFICE VISIT (OUTPATIENT)
Dept: URGENT CARE | Facility: CLINIC | Age: 34
End: 2024-10-26
Payer: COMMERCIAL

## 2024-10-26 VITALS
RESPIRATION RATE: 19 BRPM | OXYGEN SATURATION: 97 % | SYSTOLIC BLOOD PRESSURE: 136 MMHG | WEIGHT: 190 LBS | HEART RATE: 110 BPM | TEMPERATURE: 98 F | DIASTOLIC BLOOD PRESSURE: 88 MMHG | BODY MASS INDEX: 26.5 KG/M2

## 2024-10-26 DIAGNOSIS — J40 BRONCHITIS: ICD-10-CM

## 2024-10-26 DIAGNOSIS — R50.9 FEVER, UNSPECIFIED FEVER CAUSE: Primary | ICD-10-CM

## 2024-10-26 LAB
SARS-COV-2 AG UPPER RESP QL IA: NEGATIVE
VALID CONTROL: NORMAL

## 2024-10-26 PROCEDURE — 87811 SARS-COV-2 COVID19 W/OPTIC: CPT | Performed by: PHYSICIAN ASSISTANT

## 2024-10-26 PROCEDURE — G0382 LEV 3 HOSP TYPE B ED VISIT: HCPCS | Performed by: PHYSICIAN ASSISTANT

## 2024-10-26 PROCEDURE — S9083 URGENT CARE CENTER GLOBAL: HCPCS | Performed by: PHYSICIAN ASSISTANT

## 2024-10-26 RX ORDER — PREDNISONE 20 MG/1
TABLET ORAL
Qty: 10 TABLET | Refills: 0 | Status: SHIPPED | OUTPATIENT
Start: 2024-10-26

## 2024-10-26 RX ORDER — ALBUTEROL SULFATE 90 UG/1
2 INHALANT RESPIRATORY (INHALATION) EVERY 6 HOURS PRN
Qty: 18 G | Refills: 0 | Status: SHIPPED | OUTPATIENT
Start: 2024-10-26

## 2024-10-26 RX ORDER — AZITHROMYCIN 250 MG/1
TABLET, FILM COATED ORAL
Qty: 6 TABLET | Refills: 0 | Status: SHIPPED | OUTPATIENT
Start: 2024-10-26 | End: 2024-10-31

## 2024-10-26 NOTE — LETTER
October 26, 2024     Patient: Balta Frey   YOB: 1990   Date of Visit: 10/26/2024       To Whom It May Concern:    Above patient seen in office today for acute medical ailment.  May  attempt return to work in next 2-3 days as tolerated.        Sincerely,        Ruchi Encarnacion PA-C    CC: No Recipients

## 2024-10-26 NOTE — PROGRESS NOTES
[facility]    NAME: Balta Frey is a 34 y.o. male  : 1990    MRN: [id number]  DATE: 2024  TIME: 3:48 PM    Assessment and Plan   Fever, unspecified fever cause [R50.9]  1. Fever, unspecified fever cause  Poct Covid 19 Rapid Antigen Test      2. Bronchitis  albuterol (PROVENTIL HFA,VENTOLIN HFA) 90 mcg/act inhaler    predniSONE 20 mg tablet    azithromycin (ZITHROMAX) 250 mg tablet          Patient Instructions     Patient Instructions   Rapid COVID test negative.  This could be viral respiratory illness however due to your risk factors will cover with antibiotic.  Take as instructed.    Albuterol inhaler as needed for chest tightness and congestion.    Prednisone as an anti-inflammatory to try and help open up bronchial passages and ease tightness in chest and shortness of breath.  Take prednisone as instructed.  While on prednisone do not take any ibuprofen or ibuprofen like products.  May safely take Tylenol if needed.    Due to abnormal lung sounds, provider recommends that you contact primary care office as soon as possible to arrange follow-up for approximately 7 to 10 days for reevaluation.    If you would develop any severe weakness, significant worsening of shortness of breath and/or chest pain please proceed immediately to emergency room for further evaluation.    Chief Complaint     Chief Complaint   Patient presents with    Cough     Cough and fever since MON-TUES. Cough is now worse. Sick contacts with URI's. No congestion or ear pain. Coughing is worse with lying down - pt feels crackling in chest when lying.        History of Present Illness   Balta Frey presents to the clinic c/o  34-year-old male comes in with fever, cough, chest congestion.    Started: Monday and Tuesday he noted cough and fever.  Tmax over 102.  Associated signs and symptoms: No runny nose nasal congestion or sore throat.  Minimal postnasal drip.  Cough is deep and chesty and he has noted some  dyspnea on exertion.  Modifying factors: Using DayQuil and NyQuil.  Known Exposures: Family members had recent upper respiratory illness.  Recent travel:  No.  Hx asthma:  No.  Hx pneumonia:  No.  Smoker: Yes.    Patient works IT at [employer].        Review of Systems   Review of Systems   Constitutional:  Positive for activity change, appetite change, fatigue and fever. Negative for chills, diaphoresis and unexpected weight change.   HENT:  Positive for postnasal drip. Negative for congestion, ear discharge, ear pain, facial swelling, hearing loss, rhinorrhea, sinus pressure, sinus pain, sore throat, trouble swallowing and voice change.    Eyes:  Negative for photophobia, pain, discharge, redness, itching and visual disturbance.   Respiratory:  Positive for cough and chest tightness. Negative for apnea, choking, shortness of breath, wheezing and stridor.    Cardiovascular: Negative.    Gastrointestinal: Negative.    Skin:  Negative for rash.   Hematological:  Negative for adenopathy.       Current Medications     Long-Term Medications   Medication Sig Dispense Refill    ibuprofen (MOTRIN) 800 mg tablet Take 1 tablet (800 mg total) by mouth every 8 (eight) hours (Patient not taking: Reported on 7/16/2024) 30 tablet 0    methocarbamol (ROBAXIN) 500 mg tablet Take 1 tablet (500 mg total) by mouth 4 (four) times a day (Patient not taking: Reported on 7/16/2024) 30 tablet 0    naproxen (NAPROSYN) 500 mg tablet Take 1 tablet (500 mg total) by mouth 2 (two) times a day with meals (Patient not taking: Reported on 7/16/2024) 60 tablet 0    nicotine (NICODERM CQ) 21 mg/24 hr TD 24 hr patch Place 1 patch on the skin every 24 hours (Patient not taking: Reported on 7/16/2024) 28 patch 0       Current Allergies     Allergies as of 10/26/2024    (No Known Allergies)          The following portions of the patient's history were reviewed and updated as appropriate: allergies, current medications, past family history, past  medical history, past social history, past surgical history and problem list.  History reviewed. No pertinent past medical history.  History reviewed. No pertinent surgical history.  Family History   Problem Relation Age of Onset    Hypertension Father     Anxiety disorder Son        Objective   /88   Pulse (!) 110   Temp 98 °F (36.7 °C) (Tympanic)   Resp 19   Wt 86.2 kg (190 lb)   SpO2 97%   BMI 26.50 kg/m²   No LMP for male patient.       Physical Exam     Physical Exam  Vitals and nursing note reviewed.   Constitutional:       General: He is not in acute distress.     Appearance: He is well-developed. He is ill-appearing. He is not toxic-appearing or diaphoretic.      Comments: No trismus or conversational dyspnea.  Appears mildly ill but in no acute distress.   HENT:      Head: Normocephalic and atraumatic.      Right Ear: Tympanic membrane, ear canal and external ear normal.      Left Ear: Tympanic membrane, ear canal and external ear normal.      Nose: No congestion or rhinorrhea.      Mouth/Throat:      Mouth: Mucous membranes are moist.      Pharynx: No oropharyngeal exudate or posterior oropharyngeal erythema.   Eyes:      General: No scleral icterus.        Right eye: No discharge.         Left eye: No discharge.      Conjunctiva/sclera: Conjunctivae normal.      Pupils: Pupils are equal, round, and reactive to light.   Neck:      Trachea: No tracheal deviation.   Cardiovascular:      Rate and Rhythm: Regular rhythm. Tachycardia present.      Heart sounds: Normal heart sounds. No murmur heard.     No friction rub. No gallop.   Pulmonary:      Effort: Pulmonary effort is normal. No respiratory distress.      Breath sounds: No stridor. Wheezing and rhonchi present. No rales.      Comments: Sonorous wheezes and rhonchi throughout.  Musculoskeletal:      Cervical back: Normal range of motion and neck supple. No rigidity or tenderness.   Lymphadenopathy:      Cervical: No cervical adenopathy.    Skin:     General: Skin is warm and dry.      Findings: No rash.      Comments: No acute rashes   Neurological:      Mental Status: He is alert and oriented to person, place, and time.   Psychiatric:         Mood and Affect: Mood normal.         Behavior: Behavior normal.

## 2024-10-26 NOTE — PATIENT INSTRUCTIONS
Rapid COVID test negative.  This could be viral respiratory illness however due to your risk factors will cover with antibiotic.  Take as instructed.    Albuterol inhaler as needed for chest tightness and congestion.    Prednisone as an anti-inflammatory to try and help open up bronchial passages and ease tightness in chest and shortness of breath.  Take prednisone as instructed.  While on prednisone do not take any ibuprofen or ibuprofen like products.  May safely take Tylenol if needed.    Due to abnormal lung sounds, provider recommends that you contact primary care office as soon as possible to arrange follow-up for approximately 7 to 10 days for reevaluation.    If you would develop any severe weakness, significant worsening of shortness of breath and/or chest pain please proceed immediately to emergency room for further evaluation.

## 2024-10-28 ENCOUNTER — PATIENT MESSAGE (OUTPATIENT)
Dept: SLEEP CENTER | Facility: CLINIC | Age: 34
End: 2024-10-28

## 2024-10-28 DIAGNOSIS — G47.33 OSA (OBSTRUCTIVE SLEEP APNEA): Primary | ICD-10-CM

## 2024-11-01 NOTE — PATIENT COMMUNICATION
Called patient to discuss sleep study results and left message.  Advised that sleep study resulted and Dr. Pa sent [employer] message with results and next steps on 10/28/24.  Advised patient to view message.  Provided sleep center number to call if any questions or if he is unable to access [employer].

## 2024-11-01 NOTE — PATIENT COMMUNICATION
Return call from patient.     Patient chooses [employer] as DME Provider, phone number given.     Compliance follow up appointment scheduled with Dr. Pa on 12/30/2024 @ 8:20 am in the [place] office.     Patient added to teams DME Set Up chat.

## 2024-11-06 ENCOUNTER — TELEPHONE (OUTPATIENT)
Dept: SLEEP CENTER | Facility: CLINIC | Age: 34
End: 2024-11-06

## 2024-11-11 LAB

## 2024-11-19 LAB
DME PARACHUTE DELIVERY DATE ACTUAL: NORMAL
DME PARACHUTE DELIVERY DATE EXPECTED: NORMAL
DME PARACHUTE DELIVERY DATE REQUESTED: NORMAL
DME PARACHUTE ITEM DESCRIPTION: NORMAL
DME PARACHUTE ORDER STATUS: NORMAL
DME PARACHUTE SUPPLIER NAME: NORMAL
DME PARACHUTE SUPPLIER PHONE: NORMAL

## 2024-12-29 ENCOUNTER — OFFICE VISIT (OUTPATIENT)
Dept: URGENT CARE | Facility: CLINIC | Age: 34
End: 2024-12-29
Payer: COMMERCIAL

## 2024-12-29 VITALS
HEART RATE: 110 BPM | TEMPERATURE: 99 F | SYSTOLIC BLOOD PRESSURE: 114 MMHG | HEIGHT: 70 IN | DIASTOLIC BLOOD PRESSURE: 76 MMHG | BODY MASS INDEX: 27.92 KG/M2 | WEIGHT: 195 LBS | OXYGEN SATURATION: 97 % | RESPIRATION RATE: 17 BRPM

## 2024-12-29 DIAGNOSIS — B34.9 ACUTE VIRAL SYNDROME: Primary | ICD-10-CM

## 2024-12-29 PROCEDURE — S9083 URGENT CARE CENTER GLOBAL: HCPCS | Performed by: PHYSICIAN ASSISTANT

## 2024-12-29 PROCEDURE — G0382 LEV 3 HOSP TYPE B ED VISIT: HCPCS | Performed by: PHYSICIAN ASSISTANT

## 2024-12-29 RX ORDER — OSELTAMIVIR PHOSPHATE 75 MG/1
75 CAPSULE ORAL EVERY 12 HOURS SCHEDULED
Qty: 10 CAPSULE | Refills: 0 | Status: SHIPPED | OUTPATIENT
Start: 2024-12-29 | End: 2025-01-03

## 2024-12-29 NOTE — PROGRESS NOTES
[facility]        NAME: Balta Frey is a 34 y.o. male  : 1990    MRN: [id number]  DATE: 2024  TIME: 10:25 AM    Assessment and Plan   Acute viral syndrome [B34.9]  1. Acute viral syndrome  oseltamivir (TAMIFLU) 75 mg capsule            Patient Instructions       Follow up with PCP as needed.  Increase fluids.    If tests have been performed at [facility], our office will contact you with results if changes need to be made to the care plan discussed with you at the visit.  [facility]    Chief Complaint     Chief Complaint   Patient presents with    Cold Like Symptoms     Cold like s/s. Fevers, headaches, sore muscles. Son tested + for flu last week. No coughing, no congestion. Fevers as high as 102. Taking OTC meds for s/s. Pts symptoms started yesterday afternoon          History of Present Illness       Sinus positive for influenza A.  Patient also complains of myalgias.    URI   This is a new problem. The current episode started yesterday. The problem has been gradually worsening. The maximum temperature recorded prior to his arrival was 102 - 102.9 F. The fever has been present for 1 to 2 days. Associated symptoms include congestion, coughing, headaches, rhinorrhea, sneezing and a sore throat. Pertinent negatives include no abdominal pain, chest pain, diarrhea, dysuria, ear pain, joint pain, joint swelling, nausea, neck pain, plugged ear sensation, rash, sinus pain, swollen glands, vomiting or wheezing. He has tried acetaminophen, antihistamine and decongestant for the symptoms. The treatment provided mild relief.       Review of Systems   Review of Systems   HENT:  Positive for congestion, rhinorrhea, sneezing and sore throat. Negative for ear pain and sinus pain.    Respiratory:  Positive for cough. Negative for wheezing.    Cardiovascular:  Negative for chest pain.   Gastrointestinal:  Negative for abdominal pain, diarrhea, nausea and  vomiting.   Genitourinary:  Negative for dysuria.   Musculoskeletal:  Negative for joint pain and neck pain.   Skin:  Negative for rash.   Neurological:  Positive for headaches.   All other systems reviewed and are negative.        Current Medications       Current Outpatient Medications:     oseltamivir (TAMIFLU) 75 mg capsule, Take 1 capsule (75 mg total) by mouth every 12 (twelve) hours for 5 days, Disp: 10 capsule, Rfl: 0    albuterol (ProAir HFA) 90 mcg/act inhaler, Inhale 2 puffs every 6 (six) hours as needed for wheezing (Patient not taking: Reported on 7/16/2024), Disp: 8.5 g, Rfl: 0    albuterol (PROVENTIL HFA,VENTOLIN HFA) 90 mcg/act inhaler, Inhale 2 puffs every 6 (six) hours as needed for wheezing, Disp: 18 g, Rfl: 0    ibuprofen (MOTRIN) 800 mg tablet, Take 1 tablet (800 mg total) by mouth every 8 (eight) hours (Patient not taking: Reported on 7/16/2024), Disp: 30 tablet, Rfl: 0    methocarbamol (ROBAXIN) 500 mg tablet, Take 1 tablet (500 mg total) by mouth 4 (four) times a day (Patient not taking: Reported on 7/16/2024), Disp: 30 tablet, Rfl: 0    naproxen (NAPROSYN) 500 mg tablet, Take 1 tablet (500 mg total) by mouth 2 (two) times a day with meals (Patient not taking: Reported on 7/16/2024), Disp: 60 tablet, Rfl: 0    nicotine (NICODERM CQ) 21 mg/24 hr TD 24 hr patch, Place 1 patch on the skin every 24 hours (Patient not taking: Reported on 7/16/2024), Disp: 28 patch, Rfl: 0    predniSONE 20 mg tablet, One po bid x 5 days.  Take with food (Patient not taking: Reported on 12/29/2024), Disp: 10 tablet, Rfl: 0    Current Allergies     Allergies as of 12/29/2024    (No Known Allergies)            The following portions of the patient's history were reviewed and updated as appropriate: allergies, current medications, past family history, past medical history, past social history, past surgical history and problem list.     History reviewed. No pertinent past medical history.    History reviewed. No  "pertinent surgical history.    Family History   Problem Relation Age of Onset    Hypertension Father     Anxiety disorder Son          Medications have been verified.        Objective   /76   Pulse (!) 110   Temp 99 °F (37.2 °C) (Tympanic)   Resp 17   Ht 5' 10\" (1.778 m)   Wt 88.5 kg (195 lb)   SpO2 97%   BMI 27.98 kg/m²   No LMP for male patient.       Physical Exam     Physical Exam  Vitals and nursing note reviewed.   Constitutional:       Appearance: Normal appearance. He is normal weight.   HENT:      Right Ear: Tympanic membrane, ear canal and external ear normal.      Left Ear: Tympanic membrane, ear canal and external ear normal.      Nose: Congestion and rhinorrhea present.      Mouth/Throat:      Mouth: Mucous membranes are moist.      Pharynx: Posterior oropharyngeal erythema present. No oropharyngeal exudate.   Eyes:      Conjunctiva/sclera: Conjunctivae normal.   Cardiovascular:      Rate and Rhythm: Regular rhythm. Tachycardia present.      Pulses: Normal pulses.      Heart sounds: Normal heart sounds.   Pulmonary:      Effort: Pulmonary effort is normal.      Breath sounds: Normal breath sounds.   Neurological:      Mental Status: He is alert.   Psychiatric:         Mood and Affect: Mood normal.         Behavior: Behavior normal.                   "

## 2024-12-29 NOTE — LETTER
December 29, 2024     Patient: Balta Frey   YOB: 1990   Date of Visit: 12/29/2024       To Whom It May Concern:    It is my medical opinion that Balta Frey may return to work on 01/01/2025 .    If you have any questions or concerns, please don't hesitate to call.         Sincerely,        Otilio Estrada PA-C    CC: No Recipients